# Patient Record
Sex: FEMALE | Race: WHITE | NOT HISPANIC OR LATINO | Employment: UNEMPLOYED | URBAN - METROPOLITAN AREA
[De-identification: names, ages, dates, MRNs, and addresses within clinical notes are randomized per-mention and may not be internally consistent; named-entity substitution may affect disease eponyms.]

---

## 2018-07-18 ENCOUNTER — HOSPITAL ENCOUNTER (EMERGENCY)
Facility: HOSPITAL | Age: 18
Discharge: HOME | End: 2018-07-18
Attending: EMERGENCY MEDICINE | Admitting: EMERGENCY MEDICINE

## 2018-07-18 VITALS
BODY MASS INDEX: 20.38 KG/M2 | TEMPERATURE: 98.5 F | WEIGHT: 115 LBS | HEART RATE: 94 BPM | HEIGHT: 63 IN | DIASTOLIC BLOOD PRESSURE: 79 MMHG | SYSTOLIC BLOOD PRESSURE: 133 MMHG | RESPIRATION RATE: 22 BRPM | OXYGEN SATURATION: 99 %

## 2018-07-18 DIAGNOSIS — S61.211A LACERATION OF LEFT INDEX FINGER WITHOUT FOREIGN BODY WITHOUT DAMAGE TO NAIL, INITIAL ENCOUNTER: Primary | ICD-10-CM

## 2018-07-18 PROCEDURE — 99283 EMERGENCY DEPT VISIT LOW MDM: CPT

## 2018-07-18 RX ORDER — LIDOCAINE HYDROCHLORIDE 10 MG/ML
5 INJECTION, SOLUTION EPIDURAL; INFILTRATION; INTRACAUDAL; PERINEURAL ONCE
Status: COMPLETED | OUTPATIENT
Start: 2018-07-18 | End: 2018-07-18

## 2018-07-18 RX ADMIN — LIDOCAINE HYDROCHLORIDE 5 ML: 10 INJECTION, SOLUTION EPIDURAL; INFILTRATION; INTRACAUDAL; PERINEURAL at 17:52

## 2018-07-26 ENCOUNTER — HOSPITAL ENCOUNTER (EMERGENCY)
Facility: HOSPITAL | Age: 18
Discharge: HOME | End: 2018-07-26

## 2018-07-26 VITALS
HEIGHT: 65 IN | HEART RATE: 84 BPM | OXYGEN SATURATION: 97 % | WEIGHT: 125 LBS | SYSTOLIC BLOOD PRESSURE: 121 MMHG | TEMPERATURE: 99.1 F | BODY MASS INDEX: 20.83 KG/M2 | DIASTOLIC BLOOD PRESSURE: 72 MMHG | RESPIRATION RATE: 14 BRPM

## 2018-07-26 PROCEDURE — 99201: CPT

## 2020-10-30 ENCOUNTER — TELEPHONE (OUTPATIENT)
Dept: FAMILY MEDICINE CLINIC | Facility: CLINIC | Age: 20
End: 2020-10-30

## 2020-10-30 NOTE — TELEPHONE ENCOUNTER
Patient's mom called in about her daughter getting an appt with Dr Ribeiro. Mom is a current patient of Dr Ribeiro. Daughter is a [employer] graduate. She stated that her daughter is having an issue with her breasts also.    Please advise  [phone]

## 2020-11-02 ENCOUNTER — TELEPHONE (OUTPATIENT)
Dept: OBSTETRICS AND GYNECOLOGY | Age: 20
End: 2020-11-02

## 2020-11-02 NOTE — TELEPHONE ENCOUNTER
Mother called on behalf of this patient, she wanted stated that her daughter returned from college and she's having some breast complications. Mother stated that her left breast is larger than the other and a very tender. Mother stated that her veins are more visible than normal  and they have concerns.      This patient is not established here at our practice and would be considered as a new patient, the mother is aware that your first available is pretty far out. Mother wanted to know if she can establish with you and be sometime soon if possible? Please advise.

## 2020-11-04 ENCOUNTER — OFFICE VISIT (OUTPATIENT)
Dept: OBSTETRICS AND GYNECOLOGY | Age: 20
End: 2020-11-04

## 2020-11-04 VITALS
WEIGHT: 127 LBS | HEIGHT: 63 IN | DIASTOLIC BLOOD PRESSURE: 78 MMHG | BODY MASS INDEX: 22.5 KG/M2 | SYSTOLIC BLOOD PRESSURE: 132 MMHG

## 2020-11-04 DIAGNOSIS — N64.4 MASTALGIA: Primary | ICD-10-CM

## 2020-11-04 PROCEDURE — 99203 OFFICE O/P NEW LOW 30 MIN: CPT | Performed by: OBSTETRICS & GYNECOLOGY

## 2020-11-04 RX ORDER — DIGOXIN 125 MCG
TABLET ORAL
COMMUNITY
Start: 2020-09-10 | End: 2021-12-10

## 2020-11-04 RX ORDER — ENALAPRIL MALEATE 5 MG/1
TABLET ORAL
COMMUNITY
End: 2021-12-10

## 2020-11-04 RX ORDER — LEVONORGESTREL AND ETHINYL ESTRADIOL 0.1-0.02MG
1 KIT ORAL DAILY
Qty: 28 TABLET | Refills: 12 | Status: SHIPPED | OUTPATIENT
Start: 2020-11-04 | End: 2020-11-25

## 2020-11-04 NOTE — PROGRESS NOTES
New GYN Problem    Chief Complaint   Patient presents with   • Establish Care     New pt. no pap on file. pt c/o LT breast pain        Karyna Hartman comes in with breast pain on the left. It is also around her pacemaker. She often lays on the left side of her chest when watching TV.  Has not noticed any masses, constantly doing breast exams, has medical anxiety    Born with heart failure and has multiple surgeries    Was on OCPs in the past but quit them a few months ago    Her GMA had ovarian cancer, her mom is ovarian ca awareness of [place] advocate. Negative BRCA testing    Going to see cardiology soon    Review of Systems   Constitutional: Negative for fever and unexpected weight change.   Respiratory: Negative for shortness of breath.    Cardiovascular: Negative for chest pain.   Gastrointestinal: Negative for abdominal pain, constipation and diarrhea.   Genitourinary: Negative for frequency and urgency.   Neurological: Negative for light-headedness and headaches.   Hematological: Negative for adenopathy.   Psychiatric/Behavioral: Negative for dysphoric mood.       Histories  Past Medical History:   Diagnosis Date   • Congenital heart defect    • Pacemaker        Past Surgical History:   Procedure Laterality Date   • CARDIAC SURGERY     • INSERT / REPLACE / REMOVE PACEMAKER     • PACEMAKER IMPLANTATION     • TRICUSPID VALVE REPLACEMENT         Family History   Problem Relation Age of Onset   • Ovarian cancer Maternal Grandmother        Social History     Socioeconomic History   • Marital status: Single     Spouse name: Not on file   • Number of children: Not on file   • Years of education: Not on file   • Highest education level: Not on file   Tobacco Use   • Smoking status: Never Smoker   • Smokeless tobacco: Never Used   Substance and Sexual Activity   • Alcohol use: No   • Drug use: No   • Sexual activity: Not Currently       OB History        0    Para   0    Term   0       0    AB   0     "Living   0       SAB   0    TAB   0    Ectopic   0    Molar   0    Multiple   0    Live Births   0                Physical Exam    /78   Ht 160 cm (63\")   Wt 57.6 kg (127 lb)   LMP 11/02/2020 (Exact Date)   Breastfeeding No   BMI 22.50 kg/m²     BMI: Body mass index is 22.5 kg/m².     General:   alert, appears stated age and cooperative   psych  normal affect, full range of motion   Skin  no acne, no lesions   Neck Nontender, no lymphadenopathy, no thyromegaly   Lung lungs clear to auscultation, no wheezes or rhonchi   Heart: heart regular rate and rhythm, no murmurs   Abdomen: soft, non-tender, without masses or organomegaly   Breast: inspection negative, no nipple discharge or bleeding, no masses or nodularity palpable, pacemaker palpable, nontender         Assessment/Plan    Problems Addressed this Visit        Nervous and Auditory    Mastalgia - Primary      Diagnoses       Codes Comments    Mastalgia    -  Primary ICD-10-CM: N64.4  ICD-9-CM: 611.71         This mastalgia is new in onset, only over the past week, which was the week preceding starting her menstrual cycle.  I discussed with her the likely cause could be having come off of oral contraceptives and the breast tissue reacting to the change in her hormone cycle.  Discussed the use of evening primrose oil and vitamin D.  Her exam is benign, but to return in a few months for full annual exam including Pap smear, if continuing to have pain then would consider imaging.      Kathrine Vickers MD  11/04/2020  14:32 EST              "

## 2020-11-11 NOTE — TELEPHONE ENCOUNTER
Pts mom would like to wait as she has gotten into her cardiologist. I have called mom on several occasions to attempt to get her scheduled with no luck until today. Pt has pacemaker that they are going to see if that is the issue. Pt's mom will call back and advise if she would like to establish care with you as she is waiting to see if there is speciality office she may have to go to in reference to what is going on.

## 2020-11-18 ENCOUNTER — PATIENT MESSAGE (OUTPATIENT)
Dept: OBSTETRICS AND GYNECOLOGY | Age: 20
End: 2020-11-18

## 2020-11-25 RX ORDER — ACETAMINOPHEN AND CODEINE PHOSPHATE 120; 12 MG/5ML; MG/5ML
1 SOLUTION ORAL DAILY
Qty: 28 TABLET | Refills: 12 | Status: SHIPPED | OUTPATIENT
Start: 2020-11-25 | End: 2020-12-07

## 2020-12-07 RX ORDER — NORETHINDRONE 0.35 MG/1
1 TABLET ORAL DAILY
Qty: 84 TABLET | Refills: 3 | Status: SHIPPED | OUTPATIENT
Start: 2020-12-07 | End: 2021-09-08

## 2021-01-28 ENCOUNTER — TELEPHONE (OUTPATIENT)
Dept: FAMILY MEDICINE CLINIC | Facility: CLINIC | Age: 21
End: 2021-01-28

## 2021-01-28 NOTE — TELEPHONE ENCOUNTER
Patient's mom is calling, she is a current patient of Dr. Ribeiro. She states Dr. Ribeiro said we would work patient in as soon as possible for an appointment. Soonest I can find is 3/9 so Mom wanted me to see if we could get sooner.

## 2021-01-29 ENCOUNTER — OFFICE VISIT (OUTPATIENT)
Dept: FAMILY MEDICINE CLINIC | Facility: CLINIC | Age: 21
End: 2021-01-29

## 2021-01-29 VITALS
TEMPERATURE: 99.6 F | HEIGHT: 63 IN | BODY MASS INDEX: 21.51 KG/M2 | OXYGEN SATURATION: 98 % | WEIGHT: 121.4 LBS | SYSTOLIC BLOOD PRESSURE: 124 MMHG | HEART RATE: 83 BPM | DIASTOLIC BLOOD PRESSURE: 62 MMHG | RESPIRATION RATE: 16 BRPM

## 2021-01-29 DIAGNOSIS — Z95.0 PACEMAKER: ICD-10-CM

## 2021-01-29 DIAGNOSIS — Z87.74 S/P VSD REPAIR: ICD-10-CM

## 2021-01-29 DIAGNOSIS — F41.9 ANXIETY: ICD-10-CM

## 2021-01-29 DIAGNOSIS — R59.9 PALPABLE LYMPH NODE: Primary | ICD-10-CM

## 2021-01-29 PROCEDURE — 99203 OFFICE O/P NEW LOW 30 MIN: CPT | Performed by: INTERNAL MEDICINE

## 2021-01-29 RX ORDER — LEVONORGESTREL AND ETHINYL ESTRADIOL 0.1-0.02MG
1 KIT ORAL DAILY
COMMUNITY
Start: 2020-11-04 | End: 2021-01-29

## 2021-01-29 NOTE — PROGRESS NOTES
"Chief Complaint  Establish Care (np) and Annual Exam (cpe )    Subjective          Karyna Hartman presents to [facility] for   History of Present Illness  Here as a new patient.  Her mom is my patient.  She graduated from [employer] and was recently in [place] trying to pursue acting but has come back home due to covid/work.  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 Late November, got a cold and noticed a swollen LN left axilla and then got covid in December and LN got bigger and more sore.  It has decreased in size and sometimes she feels it and sometimes she doesn't feel it.  Was born with multiple VSD's and after surgical repair in [place], she had complete AV block requiring a pacemaker.   Her current pacemaker is in the left axillary area and was placed in August 2019.  Sometimes she gets dryness in ears and will feel when ears are crusty the  related LN's in her neck get tendern and once the skin normalizes, the LN resolve.  No weight loss.  No night sweats. No fevers.  No cough.  Appetite is normal.  Energy is good and denies fatigue.   Stopped her progesterone bcp in the fall. Started combination pill but then switched back to micronor for some reason.  Her left breast started hurting.  Saw her gyn and had an exam and she switched her back to the progesterone only BCP.  Hasn't had pap smear yet.    She admits to a lot of anxiety and OCD tendencies.  Has been dx with OCD in past but isn't on medications.  Anxiety interferes with her life and prevents her from planning and enjoying life.    Objective   Vital Signs:   /62   Pulse 83   Temp 99.6 °F (37.6 °C) (Temporal)   Resp 16   Ht 160 cm (63\")   Wt 55.1 kg (121 lb 6.4 oz)   SpO2 98%   BMI 21.51 kg/m²     Physical Exam  Vitals signs and nursing note reviewed.   Constitutional:       Appearance: Normal appearance. She is well-developed.   HENT:      " Head: Normocephalic and atraumatic.      Right Ear: External ear normal.      Left Ear: External ear normal.   Eyes:      Extraocular Movements: Extraocular movements intact.      Conjunctiva/sclera: Conjunctivae normal.   Neck:      Musculoskeletal: Neck supple.      Vascular: No carotid bruit.   Cardiovascular:      Rate and Rhythm: Normal rate and regular rhythm.      Heart sounds: Normal heart sounds.      Comments: No bruits  Pulmonary:      Effort: Pulmonary effort is normal. No respiratory distress.      Breath sounds: Normal breath sounds. No wheezing or rales.   Chest:      Breasts:         Right: No inverted nipple, mass, nipple discharge, skin change or tenderness.         Left: No inverted nipple, mass, nipple discharge, skin change or tenderness.   Abdominal:      General: Bowel sounds are normal. There is no distension.      Palpations: Abdomen is soft. There is no mass.      Tenderness: There is no abdominal tenderness.   Lymphadenopathy:      Cervical: No cervical adenopathy.   Skin:     General: Skin is warm.   Neurological:      General: No focal deficit present.      Mental Status: She is alert and oriented to person, place, and time.   Psychiatric:         Mood and Affect: Mood normal.         Behavior: Behavior normal.         Thought Content: Thought content normal.         Judgment: Judgment normal.     bilateral axilla normal other than a small fingertip deep LN in left axila most likely near her current pacemaker.  No cervical LN's palpable.  Breasts normal bilaterally     Result Review :                 Assessment and Plan    Problem List Items Addressed This Visit        Cardiac and Vasculature    S/P VSD repair    Pacemaker    Overview     She is unhappy with her current adult cardiologist.  I gave her the name of a few [facility] [place]ologists to call and schedule appt            Mental Health    Anxiety    Overview     Discussed that she might benefit from talking to a counselor to help with  OCD and anxiety.  Also a medication like prozac or zoloft would benefit her most likely.  She wants to discuss this with her mom and will get back to me.            Other Visit Diagnoses     Palpable lymph node    -  Primary    the palpable LN doesn't appear pathological however there is a significant amount of anxiety around it therefore i will get an u/s of the area to be sure.      Relevant Orders    US Axilla Left        Total time spent CC and counseling patient and ordering test, discussing care and potential medications, benefits of counseling, documenting care and reviewing notes 40 minutes.   Follow Up   Return in about 6 months (around 7/29/2021) for Annual physical.  Patient was given instructions and counseling regarding her condition or for health maintenance advice. Please see specific information pulled into the AVS if appropriate.

## 2021-02-17 ENCOUNTER — APPOINTMENT (OUTPATIENT)
Dept: ULTRASOUND IMAGING | Facility: HOSPITAL | Age: 21
End: 2021-02-17

## 2021-02-22 ENCOUNTER — TELEPHONE (OUTPATIENT)
Dept: FAMILY MEDICINE CLINIC | Facility: CLINIC | Age: 21
End: 2021-02-22

## 2021-02-22 NOTE — TELEPHONE ENCOUNTER
PATIENT STATES: Can you send my ultrasound test orders to [facility] on [address]? Its less expensive then having it done at the hospital. I hope to have it done this week. The pain around my armpit is very annoying and I want it to go away...... this was sent in  Her mychart please advise     PATIENT CAN BE REACHED ON:[phone]

## 2021-02-25 ENCOUNTER — TELEPHONE (OUTPATIENT)
Dept: FAMILY MEDICINE CLINIC | Facility: CLINIC | Age: 21
End: 2021-02-25

## 2021-02-25 DIAGNOSIS — R59.9 PALPABLE LYMPH NODE: ICD-10-CM

## 2021-02-25 NOTE — TELEPHONE ENCOUNTER
Caller: Karyna Hartman    Relationship: Self    Best call back number: [phone]    Caller requesting test results: Yes    What test was performed: Ultrasound     When was the test performed: 2/24/2021    Where was the test performed: [facility]    Additional notes: Would like a call with results.

## 2021-03-01 NOTE — TELEPHONE ENCOUNTER
PATIENT CALLED REQUESTING AN APPOINTMENT.  FIRST AVAILABLE IS IN MAY.  PATIENT ALSO REQUESTED TO SPEAK WITH MOMO AND WANTED TO SEE WHAT ELSE COULD BE DONE AS PATIENT IS STILL EXPERIENCING PAIN IN HER LEFT BREAST AND UNDER HER ARM.  PACEMAKER IS IN HER BREAST TISSUE AND WAS CURIOUS TO KNOW IF THIS COULD BE CAUSING THE PAIN AS WELL.    PATIENT STATED THAT THE PAIN HAS BEEN SINCE OCT OF  2020 AND WOULD LIKE TO GET SOME RELIEF.  IF RELIEF CAN BE TAKEN CARE OF WITH MEDICATION PLEASE USE    [facility] [id number] [place] [address] AT [facility] - [phone]  - [phone] FX     PLEASE ADVISE  [phone]

## 2021-03-09 ENCOUNTER — OFFICE VISIT (OUTPATIENT)
Dept: FAMILY MEDICINE CLINIC | Facility: CLINIC | Age: 21
End: 2021-03-09

## 2021-03-09 VITALS
TEMPERATURE: 98 F | SYSTOLIC BLOOD PRESSURE: 124 MMHG | DIASTOLIC BLOOD PRESSURE: 72 MMHG | BODY MASS INDEX: 21.85 KG/M2 | OXYGEN SATURATION: 99 % | WEIGHT: 123.3 LBS | HEART RATE: 84 BPM | RESPIRATION RATE: 16 BRPM | HEIGHT: 63 IN

## 2021-03-09 DIAGNOSIS — N64.4 MASTALGIA: ICD-10-CM

## 2021-03-09 DIAGNOSIS — F41.9 ANXIETY: Primary | ICD-10-CM

## 2021-03-09 DIAGNOSIS — N64.4 BREAST PAIN IN FEMALE: ICD-10-CM

## 2021-03-09 DIAGNOSIS — R42 LIGHTHEADEDNESS: ICD-10-CM

## 2021-03-09 PROCEDURE — 99214 OFFICE O/P EST MOD 30 MIN: CPT | Performed by: INTERNAL MEDICINE

## 2021-03-09 PROCEDURE — 93000 ELECTROCARDIOGRAM COMPLETE: CPT | Performed by: INTERNAL MEDICINE

## 2021-03-09 NOTE — PROGRESS NOTES
Chief Complaint  Breast Pain (pt states started after stopping birth control and has been going on since 10/20 and started a new one and continues to hurt ), dry eyes (pt states has some dry eyes latly worse then normal and has to use aquaphor ), Menstrual Problem (pt states last 2 months having 2 periods at once ), Chest Pain ( pt states rib pain under her breast ), and Dizziness (pt states wakes up with dizziness and feels like shes floating or off balance )    Subjective          Karyna Hartman presents to [facility]  History of Present Illness  Patient has history of congenital VSD and is status post repair as a baby.  She has a pacemaker placed due to postoperative complete AV block.  Her pacemaker is located over her left breast and was placed in Aug 2019.  She c/o left breast pain since stopping OCP in the fall.  Saw her gyn and she started something different OCPand that made it worse.  Then she restarted the original OCP.  Hurts in the morning when she wakes up.  Skin feels irritated in the lateral left breast.   Wearing bra makes it worse.  Pain comes and goes.  She can't stop thinking about the pain and thinks about it all the time.  She restarted the OCP in early January 2021.   Left breast pain worsened when she had covid in December 2020.  Taking vit e and primrose and not better.  Breast exam by me and her gyn were normal.  Hurts worse when she drives.  In the evening around 5-7 pm the pain resolves and doesn't start back until the following morning.  Left axillary u/s was negative in 2/2021. She also c/o swollen and dry eyes.  Worse on the left eye. Also c/o floating sensation when she is walking around--like an off balance sensation.    Has not seen a new cardiologist as she did not like the one she saw last year at [place].  I gave her names of multiple physicians in the local cardiology group at her last visit in late January 2021.  She has a history of anxiety and  "OCD.  She has never taken medication for either of these.  She did see a counselor at some point for OCD and anxiety a few years back.  Objective   Vital Signs:   /72   Pulse 84   Temp 98 °F (36.7 °C) (Temporal)   Resp 16   Ht 160 cm (63\")   Wt 55.9 kg (123 lb 4.8 oz)   SpO2 99%   BMI 21.84 kg/m²     Physical Exam  Vitals and nursing note reviewed.   Constitutional:       Appearance: Normal appearance. She is well-developed.   HENT:      Head: Normocephalic and atraumatic.      Right Ear: External ear normal.      Left Ear: External ear normal.   Eyes:      Extraocular Movements: Extraocular movements intact.      Conjunctiva/sclera: Conjunctivae normal.   Cardiovascular:      Rate and Rhythm: Normal rate and regular rhythm.      Heart sounds: Normal heart sounds.      Comments: No bruits  Pulmonary:      Effort: Pulmonary effort is normal. No respiratory distress.      Breath sounds: Normal breath sounds. No wheezing or rales.   Abdominal:      General: Bowel sounds are normal. There is no distension.      Palpations: Abdomen is soft. There is no mass.      Tenderness: There is no abdominal tenderness.   Musculoskeletal:      Cervical back: Neck supple.   Lymphadenopathy:      Cervical: No cervical adenopathy.   Skin:     General: Skin is warm.   Neurological:      General: No focal deficit present.      Mental Status: She is alert and oriented to person, place, and time.   Psychiatric:         Mood and Affect: Mood normal.         Behavior: Behavior normal.         Thought Content: Thought content normal.         Judgment: Judgment normal.        Result Review :                 Assessment and Plan    Diagnoses and all orders for this visit:    1. Anxiety (Primary)  -     CBC & Differential  -     Comprehensive Metabolic Panel  -     TSH  -     T4, Free    2. Mastalgia  -     CBC & Differential  -     Comprehensive Metabolic Panel  -     TSH  -     T4, Free  -     US breast left complete; Future    3. " Lightheadedness  -     CBC & Differential  -     Comprehensive Metabolic Panel  -     TSH  -     T4, Free    4. Breast pain in female  -     US breast left complete; Future        Follow Up   Return in about 6 months (around 9/9/2021).  Patient was given instructions and counseling regarding her condition or for health maintenance advice. Please see specific information pulled into the AVS if appropriate.     TUMS trial to see if this left breast pain could possibly be related to acid reflux.  We discussed possibly doing omeprazole but she would like to minimize any prescription drugs at this time.  She is going to try taking Tums or an over-the-counter Pepcid when her symptoms start in the morning to see if that resolves the pain.  The other option I stated that could be going on is musculoskeletal chest wall pain related to her multiple surgeries and the pacemaker itself.  Taken ibuprofen as needed and if that helps the pain could help us figure out if this could be musculoskeletal in origin.  I think she is most concerned that it could be something within the breast and therefore I have ordered a breast ultrasound on the left.  My exam again today is normal.  I cannot reproduce the pain by palpating the area.  There does seem to be quite a bit of anxiety related to multiple symptoms.  The floating sensation most certainly is related to anxiety.  Her EKG shows sinus rhythm today.  I have highly encouraged her to see someone at the [facility] cardiology group to follow-up on her pacemaker.  We are going to do some blood work today to make sure her thyroid CBC and CMP are normal.  Again I encouraged her to see a counselor to help work through some of the anxiety and OCD tendencies.  We also discussed possible medications but at this time she is not interested in any kind of medication.  Total time spent coordinating care 30 minutes    Answers for HPI/ROS submitted by the patient on 3/3/2021  Please describe your  symptoms.: breast pain on the side and under left breast. sore and irritation/sensitive. pain in Pacemaker sight. Sore armpit.  Have you had these symptoms before?: Yes  How long have you been having these symptoms?: Greater than 2 weeks  Please list any medications you are currently taking for this condition.: None.  Please describe any probable cause for these symptoms. : Pain started when I went off birth control and continued when I started a new pill. I stopped that pill and went back to my old perception. The pain went away for a couple weeks after two months then came back. Pain never really went away.  What is the primary reason for your visit?: Other

## 2021-03-09 NOTE — PROGRESS NOTES
Procedure     ECG 12 Lead    Date/Time: 3/9/2021 5:29 PM  Performed by: Elva Ribeiro MD  Authorized by: Elva Ribeiro MD   Comparison: not compared with previous ECG   Rhythm: sinus rhythm  Rate: normal  Conduction: non-specific intraventricular conduction delay    Clinical impression: non-specific ECG             Answers for HPI/ROS submitted by the patient on 3/3/2021  Please describe your symptoms.: breast pain on the side and under left breast. sore and irritation/sensitive. pain in Pacemaker sight. Sore armpit.  Have you had these symptoms before?: Yes  How long have you been having these symptoms?: Greater than 2 weeks  Please list any medications you are currently taking for this condition.: None.  Please describe any probable cause for these symptoms. : Pain started when I went off birth control and continued when I started a new pill. I stopped that pill and went back to my old perception. The pain went away for a couple weeks after two months then came back. Pain never really went away.  What is the primary reason for your visit?: Other

## 2021-03-10 LAB
ALBUMIN SERPL-MCNC: 5.1 G/DL (ref 3.9–5)
ALBUMIN/GLOB SERPL: 2.4 {RATIO} (ref 1.2–2.2)
ALP SERPL-CCNC: 67 IU/L (ref 39–117)
ALT SERPL-CCNC: 17 IU/L (ref 0–32)
AST SERPL-CCNC: 20 IU/L (ref 0–40)
BASOPHILS # BLD AUTO: 0.1 X10E3/UL (ref 0–0.2)
BASOPHILS NFR BLD AUTO: 1 %
BILIRUB SERPL-MCNC: 0.4 MG/DL (ref 0–1.2)
BUN SERPL-MCNC: 11 MG/DL (ref 6–20)
BUN/CREAT SERPL: 17 (ref 9–23)
CALCIUM SERPL-MCNC: 9.9 MG/DL (ref 8.7–10.2)
CHLORIDE SERPL-SCNC: 104 MMOL/L (ref 96–106)
CO2 SERPL-SCNC: 22 MMOL/L (ref 20–29)
CREAT SERPL-MCNC: 0.63 MG/DL (ref 0.57–1)
EOSINOPHIL # BLD AUTO: 0.2 X10E3/UL (ref 0–0.4)
EOSINOPHIL NFR BLD AUTO: 5 %
ERYTHROCYTE [DISTWIDTH] IN BLOOD BY AUTOMATED COUNT: 12.1 % (ref 11.7–15.4)
GLOBULIN SER CALC-MCNC: 2.1 G/DL (ref 1.5–4.5)
GLUCOSE SERPL-MCNC: 97 MG/DL (ref 65–99)
HCT VFR BLD AUTO: 37.9 % (ref 34–46.6)
HGB BLD-MCNC: 13.1 G/DL (ref 11.1–15.9)
IMM GRANULOCYTES # BLD AUTO: 0 X10E3/UL (ref 0–0.1)
IMM GRANULOCYTES NFR BLD AUTO: 0 %
LYMPHOCYTES # BLD AUTO: 1.5 X10E3/UL (ref 0.7–3.1)
LYMPHOCYTES NFR BLD AUTO: 28 %
MCH RBC QN AUTO: 31.4 PG (ref 26.6–33)
MCHC RBC AUTO-ENTMCNC: 34.6 G/DL (ref 31.5–35.7)
MCV RBC AUTO: 91 FL (ref 79–97)
MONOCYTES # BLD AUTO: 0.5 X10E3/UL (ref 0.1–0.9)
MONOCYTES NFR BLD AUTO: 10 %
NEUTROPHILS # BLD AUTO: 3 X10E3/UL (ref 1.4–7)
NEUTROPHILS NFR BLD AUTO: 56 %
PLATELET # BLD AUTO: 331 X10E3/UL (ref 150–450)
POTASSIUM SERPL-SCNC: 4.2 MMOL/L (ref 3.5–5.2)
PROT SERPL-MCNC: 7.2 G/DL (ref 6–8.5)
RBC # BLD AUTO: 4.17 X10E6/UL (ref 3.77–5.28)
SODIUM SERPL-SCNC: 140 MMOL/L (ref 134–144)
T4 FREE SERPL-MCNC: 1.27 NG/DL (ref 0.82–1.77)
TSH SERPL DL<=0.005 MIU/L-ACNC: 2.31 UIU/ML (ref 0.45–4.5)
WBC # BLD AUTO: 5.4 X10E3/UL (ref 3.4–10.8)

## 2021-03-16 DIAGNOSIS — N64.4 MASTALGIA: ICD-10-CM

## 2021-03-16 DIAGNOSIS — N64.4 BREAST PAIN IN FEMALE: ICD-10-CM

## 2021-04-16 ENCOUNTER — BULK ORDERING (OUTPATIENT)
Dept: CASE MANAGEMENT | Facility: OTHER | Age: 21
End: 2021-04-16

## 2021-04-16 DIAGNOSIS — Z23 IMMUNIZATION DUE: ICD-10-CM

## 2021-06-07 ENCOUNTER — OFFICE VISIT (OUTPATIENT)
Dept: FAMILY MEDICINE CLINIC | Facility: CLINIC | Age: 21
End: 2021-06-07

## 2021-06-07 VITALS — WEIGHT: 126 LBS | BODY MASS INDEX: 22.32 KG/M2 | HEIGHT: 63 IN

## 2021-06-07 DIAGNOSIS — H69.83 EUSTACHIAN TUBE DYSFUNCTION, BILATERAL: Primary | ICD-10-CM

## 2021-06-07 PROCEDURE — 99213 OFFICE O/P EST LOW 20 MIN: CPT | Performed by: NURSE PRACTITIONER

## 2021-06-07 RX ORDER — MULTIVIT WITH MINERALS/LUTEIN
250 TABLET ORAL DAILY
COMMUNITY
End: 2022-03-15

## 2021-06-07 RX ORDER — FLUTICASONE PROPIONATE 50 MCG
2 SPRAY, SUSPENSION (ML) NASAL DAILY
Qty: 18.2 ML | Refills: 2 | Status: SHIPPED | OUTPATIENT
Start: 2021-06-07 | End: 2023-03-21

## 2021-06-07 NOTE — PROGRESS NOTES
Hears popping and clicking, ear pressure no pain no fever no chills every once while little dizziness      Healthy, up-to-date with cardiology history of congenital heart defect please see ST had Covid in December already not getting the vaccine

## 2021-06-07 NOTE — PATIENT INSTRUCTIONS
Discharge instructions    Flonase 2 sprays each nostril twice daily  For 1 week then daily thereafter  Give this 2 to 3 weeks and see if it does not get better if not improved after 3 weeks call for referral to ENT  Severe pain severe dizziness fever chills urgent recheck you are      Follow-up with Dr. Ribeiro for routine care  Avoid decongestions          Eustachian Tube Dysfunction    Eustachian tube dysfunction refers to a condition in which a blockage develops in the narrow passage that connects the middle ear to the back of the nose (eustachian tube). The eustachian tube regulates air pressure in the middle ear by letting air move between the ear and nose. It also helps to drain fluid from the middle ear space.  Eustachian tube dysfunction can affect one or both ears. When the eustachian tube does not function properly, air pressure, fluid, or both can build up in the middle ear.  What are the causes?  This condition occurs when the eustachian tube becomes blocked or cannot open normally. Common causes of this condition include:  · Ear infections.  · Colds and other infections that affect the nose, mouth, and throat (upper respiratory tract).  · Allergies.  · Irritation from cigarette smoke.  · Irritation from stomach acid coming up into the esophagus (gastroesophageal reflux). The esophagus is the tube that carries food from the mouth to the stomach.  · Sudden changes in air pressure, such as from descending in an airplane or scuba diving.  · Abnormal growths in the nose or throat, such as:  ? Growths that line the nose (nasal polyps).  ? Abnormal growth of cells (tumors).  ? Enlarged tissue at the back of the throat (adenoids).  What increases the risk?  You are more likely to develop this condition if:  · You smoke.  · You are overweight.  · You are a child who has:  ? Certain birth defects of the mouth, such as cleft palate.  ? Large tonsils or adenoids.  What are the signs or symptoms?  Common symptoms of  "this condition include:  · A feeling of fullness in the ear.  · Ear pain.  · Clicking or popping noises in the ear.  · Ringing in the ear.  · Hearing loss.  · Loss of balance.  · Dizziness.  Symptoms may get worse when the air pressure around you changes, such as when you travel to an area of high elevation, fly on an airplane, or go scuba diving.  How is this diagnosed?  This condition may be diagnosed based on:  · Your symptoms.  · A physical exam of your ears, nose, and throat.  · Tests, such as those that measure:  ? The movement of your eardrum (tympanogram).  ? Your hearing (audiometry).  How is this treated?  Treatment depends on the cause and severity of your condition.  · In mild cases, you may relieve your symptoms by moving air into your ears. This is called \"popping the ears.\"  · In more severe cases, or if you have symptoms of fluid in your ears, treatment may include:  ? Medicines to relieve congestion (decongestants).  ? Medicines that treat allergies (antihistamines).  ? Nasal sprays or ear drops that contain medicines that reduce swelling (steroids).  ? A procedure to drain the fluid in your eardrum (myringotomy). In this procedure, a small tube is placed in the eardrum to:  § Drain the fluid.  § Restore the air in the middle ear space.  ? A procedure to insert a balloon device through the nose to inflate the opening of the eustachian tube (balloon dilation).  Follow these instructions at home:  Lifestyle  · Do not do any of the following until your health care provider approves:  ? Travel to high altitudes.  ? Fly in airplanes.  ? Work in a pressurized cabin or room.  ? Scuba dive.  · Do not use any products that contain nicotine or tobacco, such as cigarettes and e-cigarettes. If you need help quitting, ask your health care provider.  · Keep your ears dry. Wear fitted earplugs during showering and bathing. Dry your ears completely after.  General instructions  · Take over-the-counter and " prescription medicines only as told by your health care provider.  · Use techniques to help pop your ears as recommended by your health care provider. These may include:  ? Chewing gum.  ? Yawning.  ? Frequent, forceful swallowing.  ? Closing your mouth, holding your nose closed, and gently blowing as if you are trying to blow air out of your nose.  · Keep all follow-up visits as told by your health care provider. This is important.  Contact a health care provider if:  · Your symptoms do not go away after treatment.  · Your symptoms come back after treatment.  · You are unable to pop your ears.  · You have:  ? A fever.  ? Pain in your ear.  ? Pain in your head or neck.  ? Fluid draining from your ear.  · Your hearing suddenly changes.  · You become very dizzy.  · You lose your balance.  Summary  · Eustachian tube dysfunction refers to a condition in which a blockage develops in the eustachian tube.  · It can be caused by ear infections, allergies, inhaled irritants, or abnormal growths in the nose or throat.  · Symptoms include ear pain, hearing loss, or ringing in the ears.  · Mild cases are treated with maneuvers to unblock the ears, such as yawning or ear popping.  · Severe cases are treated with medicines. Surgery may also be done (rare).  This information is not intended to replace advice given to you by your health care provider. Make sure you discuss any questions you have with your health care provider.  Document Revised: 04/09/2019 Document Reviewed: 04/09/2019  [employer] Patient Education © 2021 [employer]

## 2021-06-22 NOTE — PROGRESS NOTES
"Chief Complaint  Left Otitis Media (started saturday night)    Subjective          Karyna Hartman presents to [facility]  Hears popping and clicking, ear pressure no pain no fever no chills every once while little dizziness mild symp.   Healthy, up-to-date with cardiology history of congenital heart defect please see ST had Covid in December already not getting the vaccine      Objective   Vital Signs:   Ht 160 cm (63\")   Wt 57.2 kg (126 lb)   BMI 22.32 kg/m²     Physical Exam  Constitutional:       Appearance: Normal appearance.   HENT:      Head:      Comments: TMs are dull, no redness no swelling no tragus tenderness pharynx clear neck is supple turbinates congested mildly    Cardiovascular:      Rate and Rhythm: Normal rate and regular rhythm.   Pulmonary:      Effort: Pulmonary effort is normal.      Breath sounds: Normal breath sounds.   Skin:     General: Skin is warm and dry.   Neurological:      General: No focal deficit present.      Mental Status: She is alert and oriented to person, place, and time.   Psychiatric:         Mood and Affect: Mood normal.         Behavior: Behavior normal.        Result Review :                 Assessment and Plan    Diagnoses and all orders for this visit:    1. Eustachian tube dysfunction, bilateral (Primary)    Other orders  -     fluticasone (Flonase) 50 MCG/ACT nasal spray; 2 sprays into the nostril(s) as directed by provider Daily.  Dispense: 18.2 mL; Refill: 2        Follow Up   No follow-ups on file.  Patient was given instructions and counseling regarding her condition or for health maintenance advice. Please see specific information pulled into the AVS if appropriate.     See patient instructions get this time if not proved she will see ENT she will follow-up with Dr. Ribeiro routine care  "

## 2021-09-02 ENCOUNTER — OFFICE VISIT (OUTPATIENT)
Dept: FAMILY MEDICINE CLINIC | Facility: CLINIC | Age: 21
End: 2021-09-02

## 2021-09-02 VITALS
DIASTOLIC BLOOD PRESSURE: 68 MMHG | BODY MASS INDEX: 22.36 KG/M2 | RESPIRATION RATE: 16 BRPM | SYSTOLIC BLOOD PRESSURE: 128 MMHG | TEMPERATURE: 96.8 F | WEIGHT: 126.2 LBS | HEIGHT: 63 IN | HEART RATE: 81 BPM | OXYGEN SATURATION: 98 %

## 2021-09-02 DIAGNOSIS — L81.2 FRECKLE: ICD-10-CM

## 2021-09-02 DIAGNOSIS — R92.2 BREAST DENSITY: Primary | ICD-10-CM

## 2021-09-02 PROCEDURE — 99213 OFFICE O/P EST LOW 20 MIN: CPT | Performed by: INTERNAL MEDICINE

## 2021-09-02 NOTE — PROGRESS NOTES
"Chief Complaint  Suspicious Skin Lesion (mole in crotch area ) and Mass (pt found a lump in breast yesterday )    Subjective          Karyna Hartman presents to [facility]  History of Present Illness  C/o left breast lump for 1 day.  No painful.  She is on OCP and LMP was 3 weeks ago.  No nipple drainage. Had left breast and left axillary u/s done 3/2021 due to left breast pain and axillary pain.  Was normal.  They did recommend with persistent or recurrent sx to repeat imaging.  Also c/o mole in left groin and not sure how long it has been there.  No pain.  No d/c or discoloration to skin and no bleeding.      Objective   Vital Signs:   /68   Pulse 81   Temp 96.8 °F (36 °C) (Temporal)   Resp 16   Ht 160 cm (63\")   Wt 57.2 kg (126 lb 3.2 oz)   SpO2 98%   BMI 22.36 kg/m²     Physical Exam  Constitutional:       Appearance: Normal appearance.   Pulmonary:      Effort: Pulmonary effort is normal.   Chest:      Breasts:         Right: Normal.         Left: Normal.          Comments: Localized breast density at the 5 oclock position with fibrocystic changes by palpation  Skin:     Comments: Two tiny flat freckles at left groin.  Symmetric.  Normal brown color.     Neurological:      General: No focal deficit present.      Mental Status: She is alert and oriented to person, place, and time.   Psychiatric:         Mood and Affect: Mood normal.         Behavior: Behavior normal.         Thought Content: Thought content normal.         Judgment: Judgment normal.        Result Review :                 Assessment and Plan    Diagnoses and all orders for this visit:    1. Breast density (Primary)  -     US breast left limited; Future    2. Freckle        Follow Up   No follow-ups on file.  Patient was given instructions and counseling regarding her condition or for health maintenance advice. Please see specific information pulled into the AVS if appropriate.     Her breast exam is " essentially normal other than an asymmetric area of the 5 o'clock position on the left breast that feels more like fibrocystic changes.  I do not particularly feel a mass or nodule like she described that she felt yesterday.  I am concerned that this area does feel little cystic so I am going to get an ultrasound just to confirm normal breast tissue.  It will be good to look at the area again as its been 6 months since her previous ultrasound.  The 2 areas on her left inner thigh groin area are benign freckles and need observation at this time only.

## 2021-09-08 RX ORDER — NORETHINDRONE 0.35 MG/1
TABLET ORAL
Qty: 84 TABLET | Refills: 3 | Status: SHIPPED | OUTPATIENT
Start: 2021-09-08 | End: 2022-03-15 | Stop reason: SDUPTHER

## 2021-12-01 ENCOUNTER — TELEPHONE (OUTPATIENT)
Dept: FAMILY MEDICINE CLINIC | Facility: CLINIC | Age: 21
End: 2021-12-01

## 2021-12-01 NOTE — TELEPHONE ENCOUNTER
Moms at work right now and heading home to see her and will make a determination if she is going to take her to the ER for treatment as if she needed fluids due to dehydration [facility] would not be able to help with that. I offered her a virtual visit or office visit. Mom declined and stated she doesn't think she can allow it to go on any further without hydrating.

## 2021-12-01 NOTE — TELEPHONE ENCOUNTER
Caller: Karyna Hartman    Relationship: Self    Best call back number: [phone]    What medication are you requesting: NAUSEA MEDICATION    What are your current symptoms: VOMITING AND DIARRHEA     How long have you been experiencing symptoms: 12 HOURS AGO     Have you had these symptoms before:    [x] Yes  [] No    Have you been treated for these symptoms before:   [x] Yes  [] No    If a prescription is needed, what is your preferred pharmacy and phone number: [facility] [address] - [address] [place] [address] AT  [address] & [facility] [address] - [phone] [facility] [phone] FX     Additional notes: PATIENTS MOTHER STATES PATIENT HAS BEEN VOMITING FOR 12 HOURS

## 2021-12-02 NOTE — TELEPHONE ENCOUNTER
PATIENTS MOM CALLING BACK TO SAY SALEEM IS MUCH BETTER NOW AND THE VOMITING HAS STOPPED. SHE DOES NOT NEED TO BE SEEN     MOM> [phone]

## 2021-12-06 DIAGNOSIS — Z00.00 ROUTINE PHYSICAL EXAMINATION: Primary | ICD-10-CM

## 2021-12-09 LAB
ALBUMIN SERPL-MCNC: 4.9 G/DL (ref 3.9–5)
ALBUMIN/GLOB SERPL: 2 {RATIO} (ref 1.2–2.2)
ALP SERPL-CCNC: 59 IU/L (ref 44–121)
ALT SERPL-CCNC: 30 IU/L (ref 0–32)
APPEARANCE UR: ABNORMAL
AST SERPL-CCNC: 34 IU/L (ref 0–40)
BACTERIA #/AREA URNS HPF: ABNORMAL /[HPF]
BACTERIA UR CULT: NORMAL
BACTERIA UR CULT: NORMAL
BASOPHILS # BLD AUTO: 0 X10E3/UL (ref 0–0.2)
BASOPHILS NFR BLD AUTO: 1 %
BILIRUB SERPL-MCNC: 0.7 MG/DL (ref 0–1.2)
BILIRUB UR QL STRIP: NEGATIVE
BUN SERPL-MCNC: 11 MG/DL (ref 6–20)
BUN/CREAT SERPL: 16 (ref 9–23)
CALCIUM SERPL-MCNC: 9.6 MG/DL (ref 8.7–10.2)
CASTS URNS QL MICRO: ABNORMAL /LPF
CHLORIDE SERPL-SCNC: 102 MMOL/L (ref 96–106)
CHOLEST SERPL-MCNC: 142 MG/DL (ref 100–199)
CO2 SERPL-SCNC: 23 MMOL/L (ref 20–29)
COLOR UR: YELLOW
CREAT SERPL-MCNC: 0.67 MG/DL (ref 0.57–1)
CRYSTALS URNS MICRO: ABNORMAL
EOSINOPHIL # BLD AUTO: 0.2 X10E3/UL (ref 0–0.4)
EOSINOPHIL NFR BLD AUTO: 5 %
EPI CELLS #/AREA URNS HPF: >10 /HPF (ref 0–10)
ERYTHROCYTE [DISTWIDTH] IN BLOOD BY AUTOMATED COUNT: 12.2 % (ref 11.7–15.4)
GLOBULIN SER CALC-MCNC: 2.5 G/DL (ref 1.5–4.5)
GLUCOSE SERPL-MCNC: 92 MG/DL (ref 65–99)
GLUCOSE UR QL: NEGATIVE
HCT VFR BLD AUTO: 39.3 % (ref 34–46.6)
HDLC SERPL-MCNC: 49 MG/DL
HGB BLD-MCNC: 13.3 G/DL (ref 11.1–15.9)
HGB UR QL STRIP: NEGATIVE
IMM GRANULOCYTES # BLD AUTO: 0 X10E3/UL (ref 0–0.1)
IMM GRANULOCYTES NFR BLD AUTO: 0 %
KETONES UR QL STRIP: ABNORMAL
LDLC SERPL CALC-MCNC: 77 MG/DL (ref 0–99)
LDLC/HDLC SERPL: 1.6 RATIO (ref 0–3.2)
LEUKOCYTE ESTERASE UR QL STRIP: ABNORMAL
LYMPHOCYTES # BLD AUTO: 1.4 X10E3/UL (ref 0.7–3.1)
LYMPHOCYTES NFR BLD AUTO: 30 %
MCH RBC QN AUTO: 30.4 PG (ref 26.6–33)
MCHC RBC AUTO-ENTMCNC: 33.8 G/DL (ref 31.5–35.7)
MCV RBC AUTO: 90 FL (ref 79–97)
MICRO URNS: ABNORMAL
MONOCYTES # BLD AUTO: 0.4 X10E3/UL (ref 0.1–0.9)
MONOCYTES NFR BLD AUTO: 8 %
NEUTROPHILS # BLD AUTO: 2.6 X10E3/UL (ref 1.4–7)
NEUTROPHILS NFR BLD AUTO: 56 %
NITRITE UR QL STRIP: POSITIVE
PH UR STRIP: 5.5 [PH] (ref 5–7.5)
PLATELET # BLD AUTO: 303 X10E3/UL (ref 150–450)
POTASSIUM SERPL-SCNC: 3.8 MMOL/L (ref 3.5–5.2)
PROT SERPL-MCNC: 7.4 G/DL (ref 6–8.5)
PROT UR QL STRIP: ABNORMAL
RBC # BLD AUTO: 4.37 X10E6/UL (ref 3.77–5.28)
RBC #/AREA URNS HPF: ABNORMAL /HPF (ref 0–2)
SODIUM SERPL-SCNC: 139 MMOL/L (ref 134–144)
SP GR UR: >=1.03 (ref 1–1.03)
T4 FREE SERPL-MCNC: 1.36 NG/DL (ref 0.82–1.77)
TRIGL SERPL-MCNC: 84 MG/DL (ref 0–149)
TSH SERPL DL<=0.005 MIU/L-ACNC: 2.34 UIU/ML (ref 0.45–4.5)
UNIDENT CRYS URNS QL MICRO: PRESENT
URINALYSIS REFLEX: ABNORMAL
UROBILINOGEN UR STRIP-MCNC: 1 MG/DL (ref 0.2–1)
VLDLC SERPL CALC-MCNC: 16 MG/DL (ref 5–40)
WBC # BLD AUTO: 4.7 X10E3/UL (ref 3.4–10.8)
WBC #/AREA URNS HPF: ABNORMAL /HPF (ref 0–5)

## 2021-12-10 ENCOUNTER — OFFICE VISIT (OUTPATIENT)
Dept: FAMILY MEDICINE CLINIC | Facility: CLINIC | Age: 21
End: 2021-12-10

## 2021-12-10 VITALS
HEART RATE: 85 BPM | SYSTOLIC BLOOD PRESSURE: 116 MMHG | HEIGHT: 63 IN | OXYGEN SATURATION: 100 % | DIASTOLIC BLOOD PRESSURE: 64 MMHG | WEIGHT: 128.2 LBS | TEMPERATURE: 97.7 F | BODY MASS INDEX: 22.71 KG/M2 | RESPIRATION RATE: 16 BRPM

## 2021-12-10 DIAGNOSIS — F41.9 ANXIETY: ICD-10-CM

## 2021-12-10 DIAGNOSIS — Z00.00 WELL ADULT EXAM: ICD-10-CM

## 2021-12-10 DIAGNOSIS — R82.90 ABNORMAL URINALYSIS: Primary | ICD-10-CM

## 2021-12-10 PROCEDURE — 99395 PREV VISIT EST AGE 18-39: CPT | Performed by: INTERNAL MEDICINE

## 2021-12-10 RX ORDER — ESCITALOPRAM OXALATE 5 MG/1
5 TABLET ORAL DAILY
Qty: 30 TABLET | Refills: 5 | Status: SHIPPED | OUTPATIENT
Start: 2021-12-10 | End: 2022-06-08

## 2021-12-10 NOTE — PROGRESS NOTES
"Chief Complaint  Lump (pt complains of lump on left side of head ), Annual Exam (cpe ), and Chest Pain ( pt complains of chest pain when sitting and feels anxious )    Subjective          Karyna Hartman presents to [facility]  History of Present Illness  Here for CPE.  Pain over her pacemaker that was replaced in 2018 over the left chest wall and radiates into latera chest wall and axilla.  Occurs when she is seating at her desk.  No problems when she is up moving around.  advil nor tylenol help reduce the discomfort.  Occurs mainly at work. ? Anxiety and gets nervous about going to the doctor.  When she is relaxed she feels better.  She has a gynecologist and will get with her for her pap smear.  Has a tender LN over left posterior auricular area that has shrank over the past week. Has scalp psoriasis.   Area is improving.  Wanting a breast exam today.  Not ready for pap smear.  Scared.    Objective   Vital Signs:   /64   Pulse 85   Temp 97.7 °F (36.5 °C) (Temporal)   Resp 16   Ht 160 cm (63\")   Wt 58.2 kg (128 lb 3.2 oz)   SpO2 100%   BMI 22.71 kg/m²     Physical Exam  Vitals and nursing note reviewed.   Constitutional:       Appearance: Normal appearance. She is well-developed.   HENT:      Head: Normocephalic and atraumatic.      Right Ear: External ear normal.      Left Ear: External ear normal.   Eyes:      Extraocular Movements: Extraocular movements intact.      Conjunctiva/sclera: Conjunctivae normal.   Neck:      Vascular: No carotid bruit.   Cardiovascular:      Rate and Rhythm: Normal rate and regular rhythm.      Heart sounds: Normal heart sounds.      Comments: No bruits  Multiple healed incisions of chest wall from open heart surgery and pacemaker placement.   Breasts are normal.  No masses.  Pleuritic tenderness over the pacemaker incisions.   Pulmonary:      Effort: Pulmonary effort is normal. No respiratory distress.      Breath sounds: Normal breath " sounds. No wheezing or rales.   Chest:   Breasts:      Right: No inverted nipple, mass, nipple discharge, skin change or tenderness.      Left: No inverted nipple, mass, nipple discharge, skin change or tenderness.       Abdominal:      General: Bowel sounds are normal. There is no distension.      Palpations: Abdomen is soft. There is no mass.      Tenderness: There is no abdominal tenderness.   Musculoskeletal:      Cervical back: Neck supple.   Lymphadenopathy:      Cervical: No cervical adenopathy.   Skin:     General: Skin is warm.   Neurological:      General: No focal deficit present.      Mental Status: She is alert and oriented to person, place, and time.   Psychiatric:         Mood and Affect: Mood normal.         Behavior: Behavior normal.         Thought Content: Thought content normal.         Judgment: Judgment normal.        Result Review :                 Assessment and Plan    Diagnoses and all orders for this visit:    1. Abnormal urinalysis (Primary)  -     Urinalysis With Culture If Indicated -; Future    2. Well adult exam    3. Anxiety    Other orders  -     escitalopram (Lexapro) 5 MG tablet; Take 1 tablet by mouth Daily.  Dispense: 30 tablet; Refill: 5        Follow Up   Return in about 1 year (around 12/10/2022).  Patient was given instructions and counseling regarding her condition or for health maintenance advice. Please see specific information pulled into the AVS if appropriate.     Anxiety is negatively impacting her day to day life--seems to be a factor in her atypical, pleuritic CP.  She sees her cardiologist regularly for pacemaker checks (h/o VSD repair as baby)  Start lexapro 5 mg qd and cautioned on side effects.   Repeat ua c/s due to very concentrated urine (just got over stomach virus)  O/w labs look good  Vaccinations reviewed.  She is up to date on gardasil but doesn't recall dates.  Declines Tdap.  Flu shot unavailable in our office  Highly encouraged her to get with her gyn  for her pap smear.  Preventative counseling provided.

## 2022-03-15 ENCOUNTER — OFFICE VISIT (OUTPATIENT)
Dept: OBSTETRICS AND GYNECOLOGY | Age: 22
End: 2022-03-15

## 2022-03-15 VITALS
DIASTOLIC BLOOD PRESSURE: 74 MMHG | BODY MASS INDEX: 22.71 KG/M2 | HEIGHT: 63 IN | SYSTOLIC BLOOD PRESSURE: 126 MMHG | WEIGHT: 128.2 LBS

## 2022-03-15 DIAGNOSIS — Z30.41 ENCOUNTER FOR SURVEILLANCE OF CONTRACEPTIVE PILLS: Primary | ICD-10-CM

## 2022-03-15 DIAGNOSIS — Z01.419 ENCOUNTER FOR GYNECOLOGICAL EXAMINATION WITHOUT ABNORMAL FINDING: ICD-10-CM

## 2022-03-15 PROCEDURE — 99395 PREV VISIT EST AGE 18-39: CPT | Performed by: OBSTETRICS & GYNECOLOGY

## 2022-03-15 RX ORDER — NORETHINDRONE 0.35 MG/1
1 TABLET ORAL DAILY
Qty: 84 TABLET | Refills: 3 | Status: SHIPPED | OUTPATIENT
Start: 2022-03-15 | End: 2022-06-14

## 2022-03-15 RX ORDER — VIT C/VIT D3/E/ZINC/ELDERBERRY 65 MG-3.15
TABLET,CHEWABLE ORAL
COMMUNITY
End: 2022-09-21

## 2022-03-15 NOTE — PROGRESS NOTES
Routine Annual Visit    3/15/2022    Patient: Karyna Hartman          MR#:[id number]      Chief Complaint   Patient presents with   • Gynecologic Exam     AE Today, Last AE 2000       History of Present Illness    21 y.o. female  who presents for annual exam. She is doing well, just notes occasional abdominal cramping after eating. Worse with certain foods, suggested she keep track of foods that seem to cause this and discuss with PCP.  No breast pain issues  Taking micronor and happy, only occasional bleeding. Hx dysmenorrhea  Not SA currently.       Health Maintenance  Gardasil did receive  Last pap: none yet    No LMP recorded. (Menstrual status: Oral contraceptives).  Obstetric History:  OB History        0    Para   0    Term   0       0    AB   0    Living   0       SAB   0    IAB   0    Ectopic   0    Molar   0    Multiple   0    Live Births   0               Menstrual History:     No LMP recorded. (Menstrual status: Oral contraceptives).       ________________________________________  Patient Active Problem List   Diagnosis   • Mastalgia   • S/P VSD repair   • Pacemaker   • Anxiety       Past Medical History:   Diagnosis Date   • Congenital heart defect    • Pacemaker    • Pacemaker    • VSD (ventricular septal defect)        Family History   Problem Relation Age of Onset   • Ovarian cancer Maternal Grandmother    • Endometriosis Maternal Aunt    • Drug abuse Brother        Past Surgical History:   Procedure Laterality Date   • CARDIAC SURGERY     • INSERT / REPLACE / REMOVE PACEMAKER     • PACEMAKER IMPLANTATION     • TRICUSPID VALVE REPLACEMENT         Social History     Tobacco Use   Smoking Status Never Smoker   Smokeless Tobacco Never Used       has a current medication list which includes the following prescription(s): escitalopram, evening primrose oil, will, airborne elderberry, prenatal vit-fe fumarate-fa, vitamin e, and  "fluticasone.  ________________________________________      Review of Systems   Constitutional: Negative for fever and unexpected weight change.   Respiratory: Negative for shortness of breath.    Cardiovascular: Negative for chest pain.   Gastrointestinal: Negative for abdominal pain, constipation and diarrhea.        Abd cramping   Genitourinary: Negative for frequency and urgency.   Hematological: Negative for adenopathy.   Psychiatric/Behavioral: Negative for dysphoric mood.       Objective   Physical Exam    /74   Ht 160 cm (63\")   Wt 58.2 kg (128 lb 3.2 oz)   Breastfeeding No   BMI 22.71 kg/m²    BP Readings from Last 3 Encounters:   03/15/22 126/74   12/10/21 116/64   09/02/21 128/68      Wt Readings from Last 3 Encounters:   03/15/22 58.2 kg (128 lb 3.2 oz)   12/10/21 58.2 kg (128 lb 3.2 oz)   09/02/21 57.2 kg (126 lb 3.2 oz)         BMI: Body mass index is 22.71 kg/m².       General:   alert, appears stated age and cooperative   Neck: No thyromegaly or LAD   Heart:: regular rate and rhythm, S1, S2 normal, no murmur, click, rub or gallop   Lungs: normal respiratory effort and auscultation   Abdomen: soft, non-tender, without masses or organomegaly   Breast: inspection negative, no nipple discharge or bleeding, no masses or nodularity palpable   Urethra and bladder: urethral meatus normal; bladder nontender to palpation;   Vulva: normal, Bartholin's, Urethra, Warrens's normal   Vagina: normal mucosa, normal discharge   Cervix: no lesions and nulliparous appearance   Uterus: normal size and anteverted   Adnexa: normal adnexa and no mass, fullness, tenderness       Assessment:    normal annual exam  Cervical cancer screening  STI screening  OCP contraception     Plan:    Plan     []  Mammogram request made  [x]  PAP done  []  Labs:   [x]  GC/Chl/TV  []  DEXA scan   []  Referral for colonoscopy:     Refill of micronor to pharmacy today    Counseling  [x]  Nutrition  [x]  Physical activity/regular " exercise   [x]  Healthy weight  []  Injury prevention  []  Smoking cessation  []  Substance misuse/abuse  [x]  Sexual behavior  []  STD prevention  [x]  Contraception  []  Dental health  []  Mental health  []  Immunization  [x]  Encouraged SBE        Kathrine Vickers MD  03/15/2022  10:55 EDT

## 2022-03-20 LAB
C TRACH RRNA CVX QL NAA+PROBE: NEGATIVE
CONV .: NORMAL
CYTOLOGIST CVX/VAG CYTO: NORMAL
CYTOLOGY CVX/VAG DOC CYTO: NORMAL
CYTOLOGY CVX/VAG DOC THIN PREP: NORMAL
DX ICD CODE: NORMAL
HIV 1 & 2 AB SER-IMP: NORMAL
N GONORRHOEA RRNA CVX QL NAA+PROBE: NEGATIVE
OTHER STN SPEC: NORMAL
STAT OF ADQ CVX/VAG CYTO-IMP: NORMAL
T VAGINALIS RRNA SPEC QL NAA+PROBE: NEGATIVE

## 2022-03-21 NOTE — PROGRESS NOTES
Please notify that her Pap was normal, there were no abnormal cells found. Also screening for gonorrhea, chlamydia, and trichomonas was negative    Kathrine Vickers MD  3/21/2022  06:57 EDT

## 2022-06-08 RX ORDER — ESCITALOPRAM OXALATE 5 MG/1
TABLET ORAL
Qty: 30 TABLET | Refills: 5 | Status: SHIPPED | OUTPATIENT
Start: 2022-06-08 | End: 2022-12-15

## 2022-06-08 NOTE — TELEPHONE ENCOUNTER
Rx Refill Note  Requested Prescriptions     Pending Prescriptions Disp Refills   • escitalopram (LEXAPRO) 5 MG tablet [Pharmacy Med Name: ESCITALOPRAM 5 MG TABLET] 30 tablet 5     Sig: TAKE ONE TABLET BY MOUTH DAILY      Last office visit with prescribing clinician: 12/10/2021      Next office visit with prescribing clinician: Visit date not found            Luzmaria Cadena MA  06/08/22, 09:09 EDT

## 2022-06-14 RX ORDER — NORETHINDRONE 0.35 MG/1
TABLET ORAL
Qty: 84 TABLET | Refills: 3 | Status: SHIPPED | OUTPATIENT
Start: 2022-06-14 | End: 2023-03-21

## 2022-06-16 ENCOUNTER — OFFICE VISIT (OUTPATIENT)
Dept: FAMILY MEDICINE CLINIC | Facility: CLINIC | Age: 22
End: 2022-06-16

## 2022-06-16 VITALS
OXYGEN SATURATION: 98 % | SYSTOLIC BLOOD PRESSURE: 119 MMHG | BODY MASS INDEX: 21.26 KG/M2 | TEMPERATURE: 97.5 F | DIASTOLIC BLOOD PRESSURE: 67 MMHG | HEART RATE: 87 BPM | WEIGHT: 120 LBS | HEIGHT: 63 IN

## 2022-06-16 DIAGNOSIS — R05.9 COUGH: Primary | ICD-10-CM

## 2022-06-16 LAB
EXPIRATION DATE: NORMAL
FLUAV AG NPH QL: NEGATIVE
FLUBV AG NPH QL: NEGATIVE
INTERNAL CONTROL: NORMAL
Lab: NORMAL

## 2022-06-16 PROCEDURE — 87804 INFLUENZA ASSAY W/OPTIC: CPT | Performed by: NURSE PRACTITIONER

## 2022-06-16 PROCEDURE — 99213 OFFICE O/P EST LOW 20 MIN: CPT | Performed by: NURSE PRACTITIONER

## 2022-06-16 NOTE — PROGRESS NOTES
"Chief Complaint  Cough (Pt c/o headaches, congestion, cough , sore throat, and ear pain x 3 days. Tested neg for covid)    Subjective        Karyna Hartman presents to [facility]  Very pleasant patient here today who developed headache congestion cough sore throat or ear pain since Monday,  Monday yesterday COVID test at home was negative  She has no lethargy no chest pain or increased shortness of breath no loss of taste or smell.  She is healthy she has had VSD repair, but she reports that she has a healthy heart and has no substantial long term issues here.    Social history  No tobacco drug or alcohol abuse,    Past medical history as above healthy    She had the [employer] vaccine and she had COVID previously        Cough        Objective   Vital Signs:  /67   Pulse 87   Temp 97.5 °F (36.4 °C)   Ht 160 cm (63\")   Wt 54.4 kg (120 lb)   SpO2 98%   BMI 21.26 kg/m²   Estimated body mass index is 21.26 kg/m² as calculated from the following:    Height as of this encounter: 160 cm (63\").    Weight as of this encounter: 54.4 kg (120 lb).    BMI is within normal parameters. No other follow-up for BMI required.      Physical Exam  Vitals reviewed.   Constitutional:       Appearance: Normal appearance. She is well-developed. She is not ill-appearing.   HENT:      Head: Normocephalic.      Ears:      Comments: Turbinates congested pharynx is clear uvula midline no cervical adenopathy     Nose: Nose normal.   Eyes:      General: No scleral icterus.     Conjunctiva/sclera: Conjunctivae normal.      Pupils: Pupils are equal, round, and reactive to light.   Neck:      Thyroid: No thyromegaly.      Vascular: No JVD.   Cardiovascular:      Rate and Rhythm: Normal rate and regular rhythm.      Heart sounds: Normal heart sounds. No murmur heard.    No friction rub. No gallop.   Pulmonary:      Effort: Pulmonary effort is normal. No respiratory distress.      Breath sounds: " Normal breath sounds. No stridor. No wheezing or rales.   Abdominal:      Comments: No hepatosplenomegaly, no ascites,   Musculoskeletal:         General: No tenderness.      Cervical back: Neck supple.   Lymphadenopathy:      Cervical: No cervical adenopathy.   Skin:     General: Skin is warm and dry.      Findings: No erythema or rash.   Neurological:      Mental Status: She is alert and oriented to person, place, and time.      Deep Tendon Reflexes: Reflexes are normal and symmetric.   Psychiatric:         Behavior: Behavior normal.         Thought Content: Thought content normal.         Judgment: Judgment normal.        Result Review :                Assessment and Plan   Diagnoses and all orders for this visit:    1. 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  Patient was given instructions and counseling regarding her condition or for health maintenance advice. Please see specific information pulled into the AVS if appropriate.   Patient has cough, upper respiratory infection viral  Flu test is negative cannot completely rule out COVID but she declines any further testing does not think she has COVID and unlikely that she does  Push fluids    Anything over-the-counter for cough and congestion Robitussin-DM high fever chest pain shortness of breath lethargy weakness emergency room immediately  Sent a message if not improving in several days

## 2022-07-13 ENCOUNTER — OFFICE VISIT (OUTPATIENT)
Dept: FAMILY MEDICINE CLINIC | Facility: CLINIC | Age: 22
End: 2022-07-13

## 2022-07-13 VITALS
TEMPERATURE: 96.8 F | HEART RATE: 86 BPM | BODY MASS INDEX: 21.26 KG/M2 | SYSTOLIC BLOOD PRESSURE: 116 MMHG | HEIGHT: 63 IN | DIASTOLIC BLOOD PRESSURE: 68 MMHG | RESPIRATION RATE: 18 BRPM | OXYGEN SATURATION: 99 %

## 2022-07-13 DIAGNOSIS — R05.9 COUGH: ICD-10-CM

## 2022-07-13 DIAGNOSIS — J30.1 SEASONAL ALLERGIC RHINITIS DUE TO POLLEN: Primary | ICD-10-CM

## 2022-07-13 PROCEDURE — 99213 OFFICE O/P EST LOW 20 MIN: CPT | Performed by: NURSE PRACTITIONER

## 2022-07-13 RX ORDER — BENZONATATE 100 MG/1
200 CAPSULE ORAL 3 TIMES DAILY PRN
Qty: 60 CAPSULE | Refills: 0 | Status: SHIPPED | OUTPATIENT
Start: 2022-07-13 | End: 2023-03-21

## 2022-07-13 NOTE — PROGRESS NOTES
"Chief Complaint  Cough and Wheezing    Subjective        Karyna Hartman presents to [facility]  Karyna presents for recent URI, treated with antibiotics. States her cough is currently dry, mild post nasal drainage. States when breathing in, has tickle and cough that is  Denies shortness of breath or wheezing.   States at times annoying and increased frequency, other times    Cough  Associated symptoms include wheezing.   Wheezing   Associated symptoms include coughing.       Objective   Vital Signs:  /68 (BP Location: Right arm, Patient Position: Sitting, Cuff Size: Adult)   Pulse 86   Temp 96.8 °F (36 °C) (Temporal)   Resp 18   Ht 160 cm (62.99\")   SpO2 99%   BMI 21.26 kg/m²   Estimated body mass index is 21.26 kg/m² as calculated from the following:    Height as of this encounter: 160 cm (62.99\").    Weight as of 6/16/22: 54.4 kg (120 lb).    BMI is within normal parameters. No other follow-up for BMI required.      Physical Exam  Vitals reviewed.   Constitutional:       Appearance: Normal appearance.   HENT:      Right Ear: Tympanic membrane and ear canal normal.      Left Ear: Tympanic membrane and ear canal normal.      Nose: Nose normal.      Mouth/Throat:      Mouth: Mucous membranes are moist.      Pharynx: Oropharynx is clear.   Eyes:      Conjunctiva/sclera: Conjunctivae normal.      Pupils: Pupils are equal, round, and reactive to light.   Cardiovascular:      Rate and Rhythm: Normal rate and regular rhythm.      Heart sounds: No murmur heard.    No friction rub. No gallop.   Pulmonary:      Effort: Pulmonary effort is normal. No respiratory distress.      Breath sounds: Normal breath sounds. No wheezing, rhonchi or rales.   Skin:     General: Skin is warm and dry.   Neurological:      Mental Status: She is alert and oriented to person, place, and time.   Psychiatric:         Mood and Affect: Mood normal.        Result Review :                Assessment and " Plan   Diagnoses and all orders for this visit:    1. Seasonal allergic rhinitis due to pollen (Primary)    2. Cough    Other orders  -     benzonatate (Tessalon Perles) 100 MG capsule; Take 2 capsules by mouth 3 (Three) Times a Day As Needed for Cough.  Dispense: 60 capsule; Refill: 0             Follow Up   No follow-ups on file.  Patient was given instructions and counseling regarding her condition or for health maintenance advice. Please see specific information pulled into the AVS if appropriate.     Allergic rhinitis: recommend changing antihistamine to zyrtec or allegra for the next 30 days, then ok to resume claritin  Recent illness, now with lingering cough, will start tessalon perles as directed, increase fluids, for increased wheezing or shortness of breath, she will message or follow up in office for furhter instructions

## 2022-09-21 ENCOUNTER — OFFICE VISIT (OUTPATIENT)
Dept: FAMILY MEDICINE CLINIC | Facility: CLINIC | Age: 22
End: 2022-09-21

## 2022-09-21 VITALS
SYSTOLIC BLOOD PRESSURE: 106 MMHG | HEIGHT: 63 IN | OXYGEN SATURATION: 99 % | BODY MASS INDEX: 20.73 KG/M2 | HEART RATE: 88 BPM | RESPIRATION RATE: 18 BRPM | DIASTOLIC BLOOD PRESSURE: 70 MMHG | TEMPERATURE: 97.1 F | WEIGHT: 117 LBS

## 2022-09-21 DIAGNOSIS — Z11.3 ROUTINE SCREENING FOR STI (SEXUALLY TRANSMITTED INFECTION): ICD-10-CM

## 2022-09-21 DIAGNOSIS — J02.9 ACUTE PHARYNGITIS, UNSPECIFIED ETIOLOGY: Primary | ICD-10-CM

## 2022-09-21 LAB
EXPIRATION DATE: NORMAL
EXPIRATION DATE: NORMAL
HETEROPH AB SER QL LA: NEGATIVE
INTERNAL CONTROL: NORMAL
INTERNAL CONTROL: NORMAL
Lab: NORMAL
Lab: NORMAL
S PYO AG THROAT QL: NEGATIVE

## 2022-09-21 PROCEDURE — 87880 STREP A ASSAY W/OPTIC: CPT | Performed by: NURSE PRACTITIONER

## 2022-09-21 PROCEDURE — 99213 OFFICE O/P EST LOW 20 MIN: CPT | Performed by: NURSE PRACTITIONER

## 2022-09-21 PROCEDURE — 86308 HETEROPHILE ANTIBODY SCREEN: CPT | Performed by: NURSE PRACTITIONER

## 2022-09-21 RX ORDER — DOXYCYCLINE 100 MG/1
100 CAPSULE ORAL EVERY 12 HOURS SCHEDULED
Qty: 20 CAPSULE | Refills: 0 | Status: SHIPPED | OUTPATIENT
Start: 2022-09-21 | End: 2023-03-21

## 2022-09-21 NOTE — PROGRESS NOTES
"Chief Complaint  Sore Throat (Ear pain, both x 1 day)    Subjective        Karyna Hartman presents to [facility]  History of Present Illness  Karyna presents with one day history of sore throat. Afebrile. White patches bilaterally, ear pain bilaterally. Mild headache. No known ill contacts.   Sore Throat         Objective   Vital Signs:  /70 (BP Location: Right arm, Patient Position: Sitting, Cuff Size: Adult)   Pulse 88   Temp 97.1 °F (36.2 °C) (Temporal)   Resp 18   Ht 160 cm (62.99\")   Wt 53.1 kg (117 lb)   SpO2 99%   BMI 20.73 kg/m²   Estimated body mass index is 20.73 kg/m² as calculated from the following:    Height as of this encounter: 160 cm (62.99\").    Weight as of this encounter: 53.1 kg (117 lb).    BMI is within normal parameters. No other follow-up for BMI required.      Physical Exam  Constitutional:       Appearance: Normal appearance.   HENT:      Right Ear: Tympanic membrane and ear canal normal.      Left Ear: Tympanic membrane and ear canal normal.      Nose: Nose normal.      Mouth/Throat:      Mouth: Mucous membranes are moist.      Pharynx: Oropharyngeal exudate and posterior oropharyngeal erythema present. No pharyngeal swelling or uvula swelling.      Tonsils: Tonsillar exudate present. No tonsillar abscesses. 1+ on the right. 1+ on the left.   Eyes:      Conjunctiva/sclera: Conjunctivae normal.      Pupils: Pupils are equal, round, and reactive to light.   Cardiovascular:      Rate and Rhythm: Normal rate and regular rhythm.      Heart sounds: No murmur heard.    No friction rub. No gallop.   Pulmonary:      Effort: Pulmonary effort is normal. No respiratory distress.      Breath sounds: Normal breath sounds. No wheezing, rhonchi or rales.   Abdominal:      General: Bowel sounds are normal. There is no distension.      Palpations: Abdomen is soft. There is no mass.      Tenderness: There is no abdominal tenderness.      Hernia: No hernia is " present.   Skin:     General: Skin is warm and dry.   Neurological:      Mental Status: She is alert and oriented to person, place, and time.   Psychiatric:         Mood and Affect: Mood normal.        Result Review :                Assessment and Plan   Diagnoses and all orders for this visit:    1. Acute pharyngitis, unspecified etiology (Primary)  -     POCT Infectious mononucleosis antibody  -     Throat / Upper Respiratory Culture - Swab, Throat  -     POCT rapid strep A    2. Routine screening for STI (sexually transmitted infection)  -     Chlamydia trachomatis, Neisseria gonorrhoeae, Trichomonas vaginalis, PCR - Urine, Urine, Clean Catch    Other orders  -     doxycycline (MONODOX) 100 MG capsule; Take 1 capsule by mouth Every 12 (Twelve) Hours.  Dispense: 20 capsule; Refill: 0      Pharyngitis: negative for strep and mono, discussed abx given appearance and symptoms, she is concerned for STI as she has a new partner, will treat with doxycycline and await cultures       Follow Up   No follow-ups on file.  Patient was given instructions and counseling regarding her condition or for health maintenance advice. Please see specific information pulled into the AVS if appropriate.

## 2022-09-23 LAB
C TRACH RRNA SPEC QL NAA+PROBE: NEGATIVE
N GONORRHOEA RRNA SPEC QL NAA+PROBE: NEGATIVE
T VAGINALIS RRNA SPEC QL NAA+PROBE: NEGATIVE

## 2022-09-24 LAB
BACTERIA SPEC RESP CULT: NORMAL
BACTERIA SPEC RESP CULT: NORMAL

## 2022-12-15 RX ORDER — ESCITALOPRAM OXALATE 5 MG/1
TABLET ORAL
Qty: 30 TABLET | Refills: 5 | Status: SHIPPED | OUTPATIENT
Start: 2022-12-15

## 2022-12-15 NOTE — TELEPHONE ENCOUNTER
Rx Refill Note  Requested Prescriptions     Pending Prescriptions Disp Refills   • escitalopram (LEXAPRO) 5 MG tablet [Pharmacy Med Name: ESCITALOPRAM 5 MG TABLET] 30 tablet 5     Sig: TAKE ONE TABLET BY MOUTH DAILY      Last office visit with prescribing clinician: 6/24/2022   Last telemedicine visit with prescribing clinician: Visit date not found   Next office visit with prescribing clinician: Visit date not found                         Would you like a call back once the refill request has been completed: [] Yes [] No    If the office needs to give you a call back, can they leave a voicemail: [] Yes [] No    Luzmaria Cadena MA  12/15/22, 08:53 EST

## 2023-03-21 ENCOUNTER — OFFICE VISIT (OUTPATIENT)
Dept: OBSTETRICS AND GYNECOLOGY | Age: 23
End: 2023-03-21
Payer: COMMERCIAL

## 2023-03-21 VITALS
WEIGHT: 118.6 LBS | DIASTOLIC BLOOD PRESSURE: 66 MMHG | HEIGHT: 63 IN | BODY MASS INDEX: 21.02 KG/M2 | SYSTOLIC BLOOD PRESSURE: 112 MMHG

## 2023-03-21 DIAGNOSIS — Z20.2 POSSIBLE EXPOSURE TO STD: Primary | ICD-10-CM

## 2023-03-21 PROCEDURE — 99395 PREV VISIT EST AGE 18-39: CPT | Performed by: OBSTETRICS & GYNECOLOGY

## 2023-03-21 NOTE — PROGRESS NOTES
Routine Annual Visit    3/21/2023    Patient: Karyna Hartman          MR#:[id number]      Chief Complaint   Patient presents with   • Gynecologic Exam     AE Today, Last AE 3/15/2022 (-)       History of Present Illness    22 y.o. female  who presents for annual exam. She is doing well, no current complaints. Stopped taking the micronor, notes that she just generally feels better off of it. Declines to start any other contraception but is SA. Discussed tracking menses and condoms.   Desires STI screen    Patient's last menstrual period was 2023 (approximate).  Obstetric History:  OB History        0    Para   0    Term   0       0    AB   0    Living   0       SAB   0    IAB   0    Ectopic   0    Molar   0    Multiple   0    Live Births   0               Menstrual History:     Patient's last menstrual period was 2023 (approximate).       ________________________________________  Patient Active Problem List   Diagnosis   • Mastalgia   • S/P VSD repair   • Pacemaker   • Anxiety       Past Medical History:   Diagnosis Date   • Congenital heart defect    • Coronary artery disease    • History of medical problems congenital heart defect   • Pacemaker    • Pacemaker    • VSD (ventricular septal defect)        Family History   Problem Relation Age of Onset   • Ovarian cancer Maternal Grandmother    • Cancer Maternal Grandmother         OVARIAN   • Endometriosis Maternal Aunt    • Drug abuse Brother    • COPD Maternal Grandfather         MESOTHELIOMA   • Cancer Paternal Grandfather         SPINAL       Past Surgical History:   Procedure Laterality Date   • CARDIAC SURGERY     • INSERT / REPLACE / REMOVE PACEMAKER     • PACEMAKER IMPLANTATION     • TRICUSPID VALVE REPLACEMENT         Social History     Tobacco Use   Smoking Status Never   Smokeless Tobacco Never       has a current medication list which includes the following prescription(s): escitalopram and  "will.  ________________________________________      Review of Systems   Constitutional: Negative for fever and unexpected weight change.   Respiratory: Negative for shortness of breath.    Cardiovascular: Negative for chest pain.   Gastrointestinal: Negative for abdominal pain, constipation and diarrhea.   Genitourinary: Negative for frequency and urgency.   Hematological: Negative for adenopathy.   Psychiatric/Behavioral: Negative for dysphoric mood.       Objective   Physical Exam    /66   Ht 160 cm (62.99\")   Wt 53.8 kg (118 lb 9.6 oz)   LMP 03/18/2023 (Approximate)   BMI 21.02 kg/m²    BP Readings from Last 3 Encounters:   03/21/23 112/66   09/21/22 106/70   07/13/22 116/68      Wt Readings from Last 3 Encounters:   03/21/23 53.8 kg (118 lb 9.6 oz)   09/21/22 53.1 kg (117 lb)   06/16/22 54.4 kg (120 lb)         BMI: Body mass index is 21.02 kg/m².       General:   alert, appears stated age and cooperative   Neck: No thyromegaly or LAD   Heart:: regular rate and rhythm, S1, S2 normal, no murmur, click, rub or gallop   Lungs: normal respiratory effort and auscultation   Abdomen: soft, non-tender, without masses or organomegaly   Breast: inspection negative, no nipple discharge or bleeding, no masses or nodularity palpable   Urethra and bladder: urethral meatus normal; bladder nontender to palpation;   Vulva: normal, Bartholin's, Urethra, [place]'s normal   Vagina: normal mucosa, normal discharge   Cervix: no lesions and nulliparous appearance   Uterus: normal size, mobile and non-tender   Adnexa: normal adnexa and no mass, fullness, tenderness       Assessment:    normal annual exam     Plan:    Plan     []  Mammogram request made  []  PAP done  []  Labs:   [x]  GC/Chl/TV  []  DEXA scan   []  Referral for colonoscopy:       Counseling  [x]  Nutrition  [x]  Physical activity/regular exercise   [x]  Healthy weight  []  Injury prevention  []  Smoking cessation  []  Substance misuse/abuse  [x]  Sexual " behavior  [x]  STD prevention  [x]  Contraception  []  Dental health  []  Mental health  []  Immunization  [x]  Encouraged SBE        Kathrine Vickers MD  03/21/2023  12:36 EDT

## 2023-03-23 NOTE — PROGRESS NOTES
Please notify that screening for gonorrhea, chlamydia, trichomonas was negative    Kathrine Vickers MD  3/23/2023  08:46 EDT

## 2023-05-18 RX ORDER — AMOXICILLIN 250 MG/1
CAPSULE ORAL
Qty: 4 CAPSULE | Refills: 1 | Status: SHIPPED | OUTPATIENT
Start: 2023-05-18

## 2023-06-06 ENCOUNTER — OFFICE VISIT (OUTPATIENT)
Dept: FAMILY MEDICINE CLINIC | Facility: CLINIC | Age: 23
End: 2023-06-06
Payer: COMMERCIAL

## 2023-06-06 VITALS
TEMPERATURE: 96.4 F | HEIGHT: 62 IN | OXYGEN SATURATION: 97 % | WEIGHT: 111.9 LBS | SYSTOLIC BLOOD PRESSURE: 109 MMHG | BODY MASS INDEX: 20.59 KG/M2 | HEART RATE: 71 BPM | DIASTOLIC BLOOD PRESSURE: 67 MMHG

## 2023-06-06 DIAGNOSIS — R42 DIZZINESS: Primary | ICD-10-CM

## 2023-06-06 LAB
B-HCG UR QL: NEGATIVE
BILIRUB BLD-MCNC: NEGATIVE MG/DL
CLARITY, POC: CLEAR
COLOR UR: YELLOW
EXPIRATION DATE: ABNORMAL
EXPIRATION DATE: NORMAL
EXPIRATION DATE: NORMAL
FLUAV AG UPPER RESP QL IA.RAPID: NOT DETECTED
FLUBV AG UPPER RESP QL IA.RAPID: NOT DETECTED
GLUCOSE UR STRIP-MCNC: NEGATIVE MG/DL
INTERNAL CONTROL: NORMAL
INTERNAL NEGATIVE CONTROL: NEGATIVE
INTERNAL POSITIVE CONTROL: POSITIVE
KETONES UR QL: NEGATIVE
LEUKOCYTE EST, POC: NEGATIVE
Lab: ABNORMAL
Lab: NORMAL
Lab: NORMAL
NITRITE UR-MCNC: NEGATIVE MG/ML
PH UR: 6 [PH] (ref 5–8)
PROT UR STRIP-MCNC: NEGATIVE MG/DL
RBC # UR STRIP: ABNORMAL /UL
SARS-COV-2 AG UPPER RESP QL IA.RAPID: NOT DETECTED
SP GR UR: 1.02 (ref 1–1.03)
UROBILINOGEN UR QL: NORMAL

## 2023-06-06 PROCEDURE — 81003 URINALYSIS AUTO W/O SCOPE: CPT | Performed by: NURSE PRACTITIONER

## 2023-06-06 PROCEDURE — 87428 SARSCOV & INF VIR A&B AG IA: CPT | Performed by: NURSE PRACTITIONER

## 2023-06-06 PROCEDURE — 99213 OFFICE O/P EST LOW 20 MIN: CPT | Performed by: NURSE PRACTITIONER

## 2023-06-06 PROCEDURE — 81025 URINE PREGNANCY TEST: CPT | Performed by: NURSE PRACTITIONER

## 2023-06-06 NOTE — PROGRESS NOTES
"Chief Complaint  Dizziness (5/31/23 Lightheaded was given meclizine on 6/4/24), brain fog , Facial Pain (Sinus pressure 5/31/23), and Abdominal Pain (Stomach is \"pulsating\")    Subjective        Karyna Hartman presents to [facility]  History of Present Illness  Pt here with concern for dizziness since she has been back from [facility].  She vaped a lot while there-marijuana from dispensary.  Normally uses marijuana here but used different formulation.  She has sinus pressure across forehead.  No fever, chills.  Stomach feels weird.    Sinus pressure  Vaped a lot during recent trip to [facility]  Weird stomach     Went to  Sunday  Meclizine not helping  Gets anxious in middle of night  LMP 6/3/23  No UTI sx    Objective   Vital Signs:  /67   Pulse 71   Temp 96.4 °F (35.8 °C) (Temporal)   Ht 157.5 cm (62\")   Wt 50.8 kg (111 lb 14.4 oz)   SpO2 97%   BMI 20.47 kg/m²   Estimated body mass index is 20.47 kg/m² as calculated from the following:    Height as of this encounter: 157.5 cm (62\").    Weight as of this encounter: 50.8 kg (111 lb 14.4 oz).       BMI is within normal parameters. No other follow-up for BMI required.      Physical Exam  Vitals and nursing note reviewed.   Constitutional:       General: She is not in acute distress.     Appearance: She is well-developed. She is not ill-appearing or diaphoretic.   HENT:      Head: Normocephalic and atraumatic.      Right Ear: Ear canal and external ear normal.      Left Ear: Ear canal and external ear normal.      Ears:      Comments: B/l TM dull     Mouth/Throat:      Mouth: Mucous membranes are moist.      Pharynx: Posterior oropharyngeal erythema present. No oropharyngeal exudate.   Eyes:      General: No scleral icterus.        Right eye: No discharge.         Left eye: No discharge.      Conjunctiva/sclera: Conjunctivae normal.   Cardiovascular:      Rate and Rhythm: Normal rate and regular rhythm.      Heart sounds: Normal " heart sounds.   Pulmonary:      Effort: Pulmonary effort is normal.      Breath sounds: Normal breath sounds.   Abdominal:      General: Bowel sounds are normal. There is no distension.      Palpations: Abdomen is soft. There is no mass.      Tenderness: There is no abdominal tenderness. There is no guarding.   Musculoskeletal:         General: No deformity.      Comments: Gait smooth and steady   Skin:     General: Skin is warm and dry.   Neurological:      Mental Status: She is alert and oriented to person, place, and time.   Psychiatric:         Mood and Affect: Mood normal.         Behavior: Behavior normal.      Result Review :                   Assessment and Plan   Diagnoses and all orders for this visit:    1. Dizziness (Primary)  -     CBC & Differential  -     Comprehensive Metabolic Panel  -     TSH Rfx On Abnormal To Free T4  -     POCT urinalysis dipstick, automated  -     POCT SARS-CoV-2 Antigen PAGE  -     POCT pregnancy, urine    I think dizziness and other sx likely related to recent trip-ears are mildly congested which we discussed.  Pregnancy negative.  Covid negative.  Continue hydration, avoid vaping.  She would like to get basic labs done today which is reasonable.           Follow Up   No follow-ups on file.  Patient was given instructions and counseling regarding her condition or for health maintenance advice. Please see specific information pulled into the AVS if appropriate.

## 2023-06-07 LAB
ALBUMIN SERPL-MCNC: 5.3 G/DL (ref 3.5–5.2)
ALBUMIN/GLOB SERPL: 2.5 G/DL
ALP SERPL-CCNC: 63 U/L (ref 39–117)
ALT SERPL-CCNC: 11 U/L (ref 1–33)
AST SERPL-CCNC: 21 U/L (ref 1–32)
BASOPHILS # BLD AUTO: 0.03 10*3/MM3 (ref 0–0.2)
BASOPHILS NFR BLD AUTO: 0.6 % (ref 0–1.5)
BILIRUB SERPL-MCNC: 0.8 MG/DL (ref 0–1.2)
BUN SERPL-MCNC: 15 MG/DL (ref 6–20)
BUN/CREAT SERPL: 24.2 (ref 7–25)
CALCIUM SERPL-MCNC: 9.8 MG/DL (ref 8.6–10.5)
CHLORIDE SERPL-SCNC: 105 MMOL/L (ref 98–107)
CO2 SERPL-SCNC: 24 MMOL/L (ref 22–29)
CREAT SERPL-MCNC: 0.62 MG/DL (ref 0.57–1)
EGFRCR SERPLBLD CKD-EPI 2021: 126.9 ML/MIN/1.73
EOSINOPHIL # BLD AUTO: 0.33 10*3/MM3 (ref 0–0.4)
EOSINOPHIL NFR BLD AUTO: 6.3 % (ref 0.3–6.2)
ERYTHROCYTE [DISTWIDTH] IN BLOOD BY AUTOMATED COUNT: 12.2 % (ref 12.3–15.4)
GLOBULIN SER CALC-MCNC: 2.1 GM/DL
GLUCOSE SERPL-MCNC: 90 MG/DL (ref 65–99)
HCT VFR BLD AUTO: 41 % (ref 34–46.6)
HGB BLD-MCNC: 13.8 G/DL (ref 12–15.9)
IMM GRANULOCYTES # BLD AUTO: 0.01 10*3/MM3 (ref 0–0.05)
IMM GRANULOCYTES NFR BLD AUTO: 0.2 % (ref 0–0.5)
LYMPHOCYTES # BLD AUTO: 1.08 10*3/MM3 (ref 0.7–3.1)
LYMPHOCYTES NFR BLD AUTO: 20.5 % (ref 19.6–45.3)
MCH RBC QN AUTO: 30.8 PG (ref 26.6–33)
MCHC RBC AUTO-ENTMCNC: 33.7 G/DL (ref 31.5–35.7)
MCV RBC AUTO: 91.5 FL (ref 79–97)
MONOCYTES # BLD AUTO: 0.4 10*3/MM3 (ref 0.1–0.9)
MONOCYTES NFR BLD AUTO: 7.6 % (ref 5–12)
NEUTROPHILS # BLD AUTO: 3.43 10*3/MM3 (ref 1.7–7)
NEUTROPHILS NFR BLD AUTO: 64.8 % (ref 42.7–76)
NRBC BLD AUTO-RTO: 0 /100 WBC (ref 0–0.2)
PLATELET # BLD AUTO: 271 10*3/MM3 (ref 140–450)
POTASSIUM SERPL-SCNC: 4.6 MMOL/L (ref 3.5–5.2)
PROT SERPL-MCNC: 7.4 G/DL (ref 6–8.5)
RBC # BLD AUTO: 4.48 10*6/MM3 (ref 3.77–5.28)
SODIUM SERPL-SCNC: 141 MMOL/L (ref 136–145)
TSH SERPL DL<=0.005 MIU/L-ACNC: 1.02 UIU/ML (ref 0.27–4.2)
WBC # BLD AUTO: 5.28 10*3/MM3 (ref 3.4–10.8)

## 2023-06-12 RX ORDER — ESCITALOPRAM OXALATE 5 MG/1
TABLET ORAL
Qty: 30 TABLET | Refills: 5 | Status: SHIPPED | OUTPATIENT
Start: 2023-06-12

## 2023-07-27 RX ORDER — ESCITALOPRAM OXALATE 10 MG/1
10 TABLET ORAL DAILY
Qty: 90 TABLET | Refills: 1 | Status: SHIPPED | OUTPATIENT
Start: 2023-07-27

## 2023-10-30 ENCOUNTER — TELEPHONE (OUTPATIENT)
Dept: OBSTETRICS AND GYNECOLOGY | Age: 23
End: 2023-10-30
Payer: COMMERCIAL

## 2023-10-30 NOTE — TELEPHONE ENCOUNTER
Patient is scheduled tomorrow for a problem visit but she want to know if she can do plan B having pacemaker ?. She will be calling cardiologist as well to check .

## 2023-10-31 ENCOUNTER — OFFICE VISIT (OUTPATIENT)
Dept: OBSTETRICS AND GYNECOLOGY | Age: 23
End: 2023-10-31
Payer: COMMERCIAL

## 2023-10-31 VITALS
SYSTOLIC BLOOD PRESSURE: 110 MMHG | WEIGHT: 113 LBS | DIASTOLIC BLOOD PRESSURE: 62 MMHG | HEIGHT: 62 IN | BODY MASS INDEX: 20.8 KG/M2

## 2023-10-31 DIAGNOSIS — N89.8 VAGINAL DISCHARGE: Primary | ICD-10-CM

## 2023-10-31 NOTE — PROGRESS NOTES
"Subjective     Chief Complaint   Patient presents with    Gynecologic Exam     Vaginal itching and odor, condom was left inside vagina over the weekend       Karyna Hartman is a 23 y.o.  whose LMP is Patient's last menstrual period was 10/17/2023.     Pt presents today with chief complaint of vaginal discharge with itching/odor  She had IC Saturday and condom was left inside vagina  She also took plan B Monday night since she had ovulated a couple days before IC  She is not regularly SA and declines anything else for contraception      No Additional Complaints Reported    The following portions of the patient's history were reviewed and updated as appropriate:vital signs, allergies, current medications, past medical history, past social history, past surgical history, and problem list      Review of Systems   Pertinent items are noted in HPI.     Objective      /62   Ht 157.5 cm (62\")   Wt 51.3 kg (113 lb)   LMP 10/17/2023   BMI 20.67 kg/m²     Physical Exam    General:   alert and no distress   Heart: Not performed today   Lungs: Not performed today.   Breast: Not performed today   Neck: na   Abdomen: {Not performed today   CVA: Not performed today   Pelvis: External genitalia: normal general appearance  Urinary system: urethral meatus normal  Vaginal: normal mucosa without prolapse or lesions, normal without tenderness, induration or masses, and normal rugae, small white/yellow discharge noted  Cervix: normal appearance   Extremities: Not performed today   Neurologic: negative   Psychiatric: Normal affect, judgement, and mood       Lab Review   Labs: No data reviewed     Imaging   No data reviewed    Assessment & Plan     ASSESSMENT  1. Vaginal discharge        PLAN  1.   Orders Placed This Encounter   Procedures    NuSwab VG+ - Swab, Vagina       2. Medications prescribed this encounter:      No orders of the defined types were placed in this encounter.      3. Swab sent for std's, BV and " yeast. Will call with results.     Follow up: MARIA TERESA Castro, APRN  10/31/2023

## 2023-11-02 LAB
A VAGINAE DNA VAG QL NAA+PROBE: NORMAL SCORE
BVAB2 DNA VAG QL NAA+PROBE: NORMAL SCORE
C ALBICANS DNA VAG QL NAA+PROBE: NEGATIVE
C GLABRATA DNA VAG QL NAA+PROBE: NEGATIVE
C TRACH DNA VAG QL NAA+PROBE: NEGATIVE
MEGA1 DNA VAG QL NAA+PROBE: NORMAL SCORE
N GONORRHOEA DNA VAG QL NAA+PROBE: NEGATIVE
T VAGINALIS DNA VAG QL NAA+PROBE: NEGATIVE

## 2023-12-18 RX ORDER — ESCITALOPRAM OXALATE 5 MG/1
5 TABLET ORAL DAILY
Qty: 90 TABLET | Refills: 2 | Status: SHIPPED | OUTPATIENT
Start: 2023-12-18

## 2023-12-18 RX ORDER — ESCITALOPRAM OXALATE 10 MG/1
10 TABLET ORAL DAILY
Qty: 90 TABLET | Refills: 1 | OUTPATIENT
Start: 2023-12-18

## 2024-01-30 ENCOUNTER — TELEPHONE (OUTPATIENT)
Dept: OBSTETRICS AND GYNECOLOGY | Age: 24
End: 2024-01-30
Payer: COMMERCIAL

## 2024-01-30 ENCOUNTER — OFFICE VISIT (OUTPATIENT)
Dept: FAMILY MEDICINE CLINIC | Facility: CLINIC | Age: 24
End: 2024-01-30
Payer: COMMERCIAL

## 2024-01-30 VITALS
OXYGEN SATURATION: 98 % | DIASTOLIC BLOOD PRESSURE: 62 MMHG | WEIGHT: 120 LBS | RESPIRATION RATE: 16 BRPM | HEART RATE: 98 BPM | HEIGHT: 62 IN | BODY MASS INDEX: 22.08 KG/M2 | SYSTOLIC BLOOD PRESSURE: 103 MMHG | TEMPERATURE: 98.2 F

## 2024-01-30 DIAGNOSIS — J02.9 SORE THROAT: ICD-10-CM

## 2024-01-30 DIAGNOSIS — R05.1 ACUTE COUGH: ICD-10-CM

## 2024-01-30 DIAGNOSIS — B34.9 ACUTE VIRAL SYNDROME: Primary | ICD-10-CM

## 2024-01-30 LAB
EXPIRATION DATE: NORMAL
EXPIRATION DATE: NORMAL
FLUAV AG UPPER RESP QL IA.RAPID: NOT DETECTED
FLUBV AG UPPER RESP QL IA.RAPID: NOT DETECTED
INTERNAL CONTROL: NORMAL
INTERNAL CONTROL: NORMAL
Lab: NORMAL
Lab: NORMAL
S PYO AG THROAT QL: NEGATIVE
SARS-COV-2 AG UPPER RESP QL IA.RAPID: NOT DETECTED

## 2024-01-30 PROCEDURE — 87428 SARSCOV & INF VIR A&B AG IA: CPT | Performed by: NURSE PRACTITIONER

## 2024-01-30 PROCEDURE — 99213 OFFICE O/P EST LOW 20 MIN: CPT | Performed by: NURSE PRACTITIONER

## 2024-01-30 PROCEDURE — 87880 STREP A ASSAY W/OPTIC: CPT | Performed by: NURSE PRACTITIONER

## 2024-01-30 NOTE — TELEPHONE ENCOUNTER
----- Message from Karyna Hartman sent at 1/30/2024  8:19 AM EST -----  Regarding: genetic testing  Contact: [phone]  What did Dr. Vickers say about where to do the labs for genetic testing?

## 2024-01-30 NOTE — PATIENT INSTRUCTIONS
Discharge instructions push fluids treat fever body aches with Tylenol or ibuprofen,    Anything over-the-counter such as Epperson's mental Lippes saline nasal spray if needed  Chloraseptic spray etc., if high fever chest pain increase shortness of breath weakness emergency room  If feeling worse tomorrow repeat your COVID if positive out reach to me immediately we should consider Paxlovid    Return to work as long as you are feeling better Thursday if I need to extend your note let me know I will be happy to do so  If you could update your condition Monday please or sooner

## 2024-01-30 NOTE — PROGRESS NOTES
"Chief Complaint  Sore Throat (Pt has had sore throat, headache,cough and  lgv since Sunday )    Subjective        Karyna Hartman presents to [facility]  History of Present Illness  Pleasant patient complains of 2 days cough congestion sore throat started Sunday, some bodyaches, without chest pain or increasing shortness of breath, generally healthy she had a septal defect repaired congenitally pacemaker but she is in good health with her heart she is without increased shortness of breath or chest pain.     She had the early COVID vaccines, no known exposure.  No history of lung disease does not smoke  Sore Throat         Objective   Vital Signs:  /62   Pulse 98   Temp 98.2 °F (36.8 °C) (Infrared)   Resp 16   Ht 157.5 cm (62.01\")   Wt 54.4 kg (120 lb)   SpO2 98%   BMI 21.94 kg/m²   Estimated body mass index is 21.94 kg/m² as calculated from the following:    Height as of this encounter: 157.5 cm (62.01\").    Weight as of this encounter: 54.4 kg (120 lb).    BMI is within normal parameters. No other follow-up for BMI required.      Physical Exam  Vitals reviewed.   Constitutional:       General: She is not in acute distress.     Appearance: Normal appearance. She is well-developed. She is not ill-appearing, toxic-appearing or diaphoretic.   HENT:      Head: Normocephalic.      Nose: Nose normal.   Eyes:      General: No scleral icterus.     Conjunctiva/sclera: Conjunctivae normal.      Pupils: Pupils are equal, round, and reactive to light.   Neck:      Thyroid: No thyromegaly.      Vascular: No JVD.   Cardiovascular:      Rate and Rhythm: Normal rate and regular rhythm.      Heart sounds: Normal heart sounds. No murmur heard.     No friction rub. No gallop.      Comments: Regular rate and rhythm, murmur 2/6 without radiation.  Pulmonary:      Effort: Pulmonary effort is normal. No respiratory distress.      Breath sounds: Normal breath sounds. No stridor. No wheezing or " rales.      Comments: Clear unlabored respirations less than 20 able to speak full sentences without dyspnea  Abdominal:      General: Bowel sounds are normal. There is no distension.      Palpations: Abdomen is soft.      Tenderness: There is no abdominal tenderness.      Comments: No hepatosplenomegaly, no ascites,   Musculoskeletal:         General: No tenderness.      Cervical back: Neck supple.   Lymphadenopathy:      Cervical: No cervical adenopathy.   Skin:     General: Skin is warm and dry.      Findings: No erythema or rash.   Neurological:      General: No focal deficit present.      Mental Status: She is alert and oriented to person, place, and time. Mental status is at baseline.      Deep Tendon Reflexes: Reflexes are normal and symmetric.   Psychiatric:         Mood and Affect: Mood normal.         Behavior: Behavior normal.         Thought Content: Thought content normal.         Judgment: Judgment normal.        Result Review :                Assessment and Plan   Diagnoses and all orders for this visit:    1. Acute viral syndrome (Primary)    2. Sore throat  -     POC Rapid Strep A    3. Acute cough  -     POCT SARS-CoV-2 + Flu Antigen PAGE             Follow Up   Return rtn wk thurs plse.  Patient was given instructions and counseling regarding her condition or for health maintenance advice. Please see specific information pulled into the AVS if appropriate.     Patient Instructions   Discharge instructions push fluids treat fever body aches with Tylenol or ibuprofen,    Anything over-the-counter such as Epperson's mental Lippes saline nasal spray if needed  Chloraseptic spray etc., if high fever chest pain increase shortness of breath weakness emergency room  If feeling worse tomorrow repeat your COVID if positive out reach to me immediately we should consider Paxlovid    Return to work as long as you are feeling better Thursday if I need to extend your note let me know I will be happy to do so  If you  could update your condition Monday please or sooner       COVID-negative flu test negative strep negative  Poor sensitivity patient understands not great negative test if she feels worse tomorrow with fever repeat  The COVID test but any significant worsening high fever lethargy weakness chest pain shortness of breath emergency room  Antivirals available for flu COVID  Hydrate well over-the-counter medicine as above close follow-up off work a couple days

## 2024-01-30 NOTE — TELEPHONE ENCOUNTER
Please let pt know that she will need to contact her insurance in regards to cost for [employer] testing.

## 2024-03-04 ENCOUNTER — OFFICE VISIT (OUTPATIENT)
Dept: OBSTETRICS AND GYNECOLOGY | Age: 24
End: 2024-03-04
Payer: COMMERCIAL

## 2024-03-04 VITALS
BODY MASS INDEX: 22.26 KG/M2 | WEIGHT: 121 LBS | SYSTOLIC BLOOD PRESSURE: 108 MMHG | HEIGHT: 62 IN | DIASTOLIC BLOOD PRESSURE: 68 MMHG

## 2024-03-04 DIAGNOSIS — N64.4 BREAST PAIN, LEFT: Primary | ICD-10-CM

## 2024-03-04 DIAGNOSIS — R39.9 UTI SYMPTOMS: ICD-10-CM

## 2024-03-04 DIAGNOSIS — Z11.3 SCREENING FOR STDS (SEXUALLY TRANSMITTED DISEASES): ICD-10-CM

## 2024-03-04 LAB
BILIRUB BLD-MCNC: NEGATIVE MG/DL
CLARITY, POC: CLEAR
COLOR UR: YELLOW
GLUCOSE UR STRIP-MCNC: NEGATIVE MG/DL
KETONES UR QL: NEGATIVE
LEUKOCYTE EST, POC: ABNORMAL
NITRITE UR-MCNC: NEGATIVE MG/ML
PH UR: 7 [PH] (ref 5–8)
PROT UR STRIP-MCNC: NEGATIVE MG/DL
RBC # UR STRIP: NEGATIVE /UL
SP GR UR: 1.01 (ref 1–1.03)
UROBILINOGEN UR QL: ABNORMAL

## 2024-03-04 PROCEDURE — 99213 OFFICE O/P EST LOW 20 MIN: CPT

## 2024-03-04 PROCEDURE — 81002 URINALYSIS NONAUTO W/O SCOPE: CPT

## 2024-03-04 NOTE — PROGRESS NOTES
"Subjective     History of Present Illness  Karyna Hartman is a 23 y.o.  female is being seen today for   Chief Complaint   Patient presents with    Follow-up     Gyn F/u - Pt c/o possible UTI & pain with urination, Pt was having sharp (Lt) sided breast pain, Pt has a pacemaker & is unsure if its related, Pt would like STD check today     C/o UTI symptoms x 3 days. States she feels as symptoms have gone away now but was experiencing pain with urination. No fevers.   Requests STD testing today.   C/o left sided breast pain x 1 month. Pt has a pacemaker, is unsure if the pain is relation. Describes that the pain occurs when breathing. Menses regular, pain does not occur around menses. No nipple discharge or skin changes. Reports she has also started working out, hot yoga. Saw cardiology last month, she did not have pain then. Pt had this pain in  and was followed up with Dr. Vickers.   Moving on the  to [place]      The following portions of the patient's history were reviewed and updated as appropriate: allergies, current medications, past family history, past medical history, past social history, past surgical history and problem list.    /68   Ht 157.5 cm (62.01\")   Wt 54.9 kg (121 lb)   LMP 2024 (Exact Date)   BMI 22.12 kg/m²         Review of Systems   Constitutional: Negative.    HENT: Negative.     Eyes: Negative.    Respiratory: Negative.     Cardiovascular: Negative.    Gastrointestinal: Negative.    Endocrine: Negative.    Genitourinary:  Positive for dysuria.   Musculoskeletal: Negative.         +left breast pain   Skin: Negative.    Allergic/Immunologic: Negative.    Neurological: Negative.    Hematological: Negative.    Psychiatric/Behavioral: Negative.         Objective   Physical Exam  Constitutional:       General: She is not in acute distress.  Cardiovascular:      Rate and Rhythm: Normal rate and regular rhythm.      Pulses: Normal pulses.      Heart sounds: Normal heart " sounds.   Pulmonary:      Effort: Pulmonary effort is normal.      Breath sounds: Normal breath sounds.   Chest:   Breasts:     Right: Normal. No swelling, mass, nipple discharge, skin change or tenderness.      Left: Normal. No swelling, mass, nipple discharge, skin change or tenderness.   Neurological:      General: No focal deficit present.      Mental Status: She is alert and oriented to person, place, and time.   Psychiatric:         Mood and Affect: Mood normal.         Behavior: Behavior normal.         Thought Content: Thought content normal.         Judgment: Judgment normal.           Assessment & Plan   Diagnoses and all orders for this visit:    1. Breast pain, left (Primary)    2. UTI symptoms  -     POC Urinalysis Dipstick  -     Urine Culture - Urine, Urine, Clean Catch    3. Screening for STDs (sexually transmitted diseases)  -     Chlamydia trachomatis, Neisseria gonorrhoeae, Trichomonas vaginalis, PCR - Urine, Urine, Clean Catch    -Normal breast exam. Recommended breast ultrasound to further investigate, since pt had this complaint in 2020 as well. Patient declined ultrasound. Recommend patient also f/u with cardiology, discussed this with pt.   -UA has small leukocytes, urine culture sent.   -STD testing by urine, will call pt with results and treat accordingly.    All questions answered. Patient verbalizes understanding and is agreeable to plan.  Return if symptoms worsen or fail to improve.

## 2024-03-06 LAB
BACTERIA UR CULT: NO GROWTH
BACTERIA UR CULT: NORMAL
C TRACH RRNA SPEC QL NAA+PROBE: NEGATIVE
N GONORRHOEA RRNA SPEC QL NAA+PROBE: NEGATIVE
T VAGINALIS RRNA SPEC QL NAA+PROBE: NEGATIVE

## 2024-09-03 RX ORDER — ESCITALOPRAM OXALATE 5 MG/1
5 TABLET ORAL DAILY
Qty: 90 TABLET | Refills: 2 | Status: SHIPPED | OUTPATIENT
Start: 2024-09-03

## 2024-09-03 NOTE — TELEPHONE ENCOUNTER
Rx Refill Note  Requested Prescriptions     Pending Prescriptions Disp Refills    escitalopram (LEXAPRO) 5 MG tablet [Pharmacy Med Name: ESCITALOPRAM 5 MG TABLET] 90 tablet 2     Sig: TAKE 1 TABLET BY MOUTH DAILY      Last office visit with prescribing clinician: Visit date not found   Last telemedicine visit with prescribing clinician: Visit date not found   Next office visit with prescribing clinician: Visit date not found                         Would you like a call back once the refill request has been completed: [] Yes [] No    If the office needs to give you a call back, can they leave a voicemail: [] Yes [] No    Shani Bravo MA  09/03/24, 13:51 EDT

## 2025-02-26 RX ORDER — ESCITALOPRAM OXALATE 10 MG/1
10 TABLET ORAL DAILY
Qty: 90 TABLET | Refills: 0 | Status: SHIPPED | OUTPATIENT
Start: 2025-02-26

## 2025-03-26 ENCOUNTER — OFFICE VISIT (OUTPATIENT)
Dept: OBSTETRICS AND GYNECOLOGY | Age: 25
End: 2025-03-26
Payer: COMMERCIAL

## 2025-03-26 VITALS
DIASTOLIC BLOOD PRESSURE: 68 MMHG | WEIGHT: 126 LBS | BODY MASS INDEX: 23.19 KG/M2 | SYSTOLIC BLOOD PRESSURE: 114 MMHG | HEIGHT: 62 IN

## 2025-03-26 DIAGNOSIS — Z01.419 ENCOUNTER FOR GYNECOLOGICAL EXAMINATION WITHOUT ABNORMAL FINDING: Primary | ICD-10-CM

## 2025-03-26 NOTE — PROGRESS NOTES
Routine Annual Visit    3/26/2025    Patient: Karyna Hartman          MR#:[id number]      Chief Complaint   Patient presents with    Gynecologic Exam     AE Today, Last pap 3/15/2022 (-). Pt wanting STD screening        History of Present Illness    24 y.o. female  who presents for annual exam.     History of Present Illness  The patient presents for a well-woman exam.    She has recently relocated from [place] and is currently seeking employment. She reports no new medical issues or family history of breast, ovarian, uterine, or colon cancer. She has been sexually active this year without any associated pain, bleeding, or dryness. She does not require birth control at this time. She experiences intermittent lower abdominal cramping, similar to menstrual cramps, which resolve spontaneously after a few minutes. Additionally, she reports hair loss.    FAMILY HISTORY  She reports no family history of breast, ovarian, uterine, or colon cancer.      Patient's last menstrual period was 2025 (exact date).  Obstetric History:  OB History          0    Para   0    Term   0       0    AB   0    Living   0         SAB   0    IAB   0    Ectopic   0    Molar   0    Multiple   0    Live Births   0               Menstrual History:     Patient's last menstrual period was 2025 (exact date).       ________________________________________  Patient Active Problem List   Diagnosis    Mastalgia    S/P VSD repair    Pacemaker    Anxiety       Past Medical History:   Diagnosis Date    Congenital heart defect     Coronary artery disease     History of medical problems congenital heart defect    Pacemaker     Pacemaker     VSD (ventricular septal defect)        Family History   Problem Relation Age of Onset    Ovarian cancer Maternal Grandmother     Cancer Maternal Grandmother         OVARIAN    Endometriosis Maternal Aunt     Drug abuse Brother     COPD Maternal Grandfather         MESOTHELIOMA    Cancer  "Paternal Grandfather         SPINAL       Past Surgical History:   Procedure Laterality Date    CARDIAC SURGERY      INSERT / REPLACE / REMOVE PACEMAKER      PACEMAKER IMPLANTATION      TRICUSPID VALVE REPLACEMENT         Social History     Tobacco Use   Smoking Status Never   Smokeless Tobacco Never       has a current medication list which includes the following prescription(s): escitalopram.  ________________________________________        Objective   Physical Exam    /68   Ht 157.5 cm (62.01\")   Wt 57.2 kg (126 lb)   LMP 03/17/2025 (Exact Date)   BMI 23.04 kg/m²    BP Readings from Last 3 Encounters:   03/26/25 114/68   03/04/24 108/68   01/30/24 103/62      Wt Readings from Last 3 Encounters:   03/26/25 57.2 kg (126 lb)   03/04/24 54.9 kg (121 lb)   01/30/24 54.4 kg (120 lb)         BMI: Body mass index is 23.04 kg/m².       General:   alert, appears stated age, and cooperative   Neck: No thyromegaly or LAD   Abdomen: soft, non-tender, without masses or organomegaly   Breast: inspection negative, no nipple discharge or bleeding, no masses or nodularity palpable   Urethra and bladder: urethral meatus normal; bladder nontender to palpation;   Vulva: normal, Bartholin's, Urethra, [place]'s normal   Vagina: normal mucosa, normal discharge   Cervix: no lesions and nulliparous appearance   Uterus: normal size, non-tender, and anteverted   Adnexa: normal adnexa and no mass, fullness, tenderness       Assessment:    normal annual exam     Plan:    Plan     Assessment & Plan  1. Well-woman examination.  Her lower abdominal cramping could be attributed to random uterine or colon cramps, which are not indicative of any serious condition. A Pap smear will be conducted today, and the results will be communicated via MyChart or phone call.    Follow-up  The patient is scheduled for a follow-up visit in 1 year.      []  Mammogram request made  [x]  PAP done  []  Labs:   []  GC/Chl/TV  []  DEXA scan   []  Referral for " colonoscopy:       Counseling  [] Menopause  [x]  Nutrition  [x]  Physical activity/regular exercise   [x]  Healthy weight  []  Injury prevention  []  Smoking cessation  []  Substance misuse/abuse  [x]  Sexual behavior  []  STD prevention  [x]  Contraception  []  Dental health  []  Mental health  []  Immunization  [x]  Encouraged SBE        Kathrine Vickers MD  03/26/2025  12:48 EDT    Patient or patient representative verbalized consent for the use of Ambient Listening during the visit with  Kathrine Vickers MD for chart documentation. 3/26/2025  12:49 EDT

## 2025-04-01 LAB
C TRACH RRNA CVX QL NAA+PROBE: NEGATIVE
CONV .: ABNORMAL
CYTOLOGIST CVX/VAG CYTO: ABNORMAL
CYTOLOGY CVX/VAG DOC CYTO: ABNORMAL
CYTOLOGY CVX/VAG DOC THIN PREP: ABNORMAL
DX ICD CODE: ABNORMAL
DX ICD CODE: ABNORMAL
N GONORRHOEA RRNA CVX QL NAA+PROBE: NEGATIVE
OTHER STN SPEC: ABNORMAL
PATHOLOGIST CVX/VAG CYTO: ABNORMAL
RECOM F/U CVX/VAG CYTO: ABNORMAL
SERVICE CMNT-IMP: ABNORMAL
STAT OF ADQ CVX/VAG CYTO-IMP: ABNORMAL
T VAGINALIS RRNA SPEC QL NAA+PROBE: NEGATIVE

## 2025-05-05 ENCOUNTER — PROCEDURE VISIT (OUTPATIENT)
Dept: OBSTETRICS AND GYNECOLOGY | Age: 25
End: 2025-05-05
Payer: COMMERCIAL

## 2025-05-05 VITALS
DIASTOLIC BLOOD PRESSURE: 70 MMHG | BODY MASS INDEX: 21.71 KG/M2 | SYSTOLIC BLOOD PRESSURE: 116 MMHG | HEIGHT: 62 IN | WEIGHT: 118 LBS

## 2025-05-05 DIAGNOSIS — R87.612 LOW GRADE SQUAMOUS INTRAEPITHELIAL LESION (LGSIL) ON CERVICAL PAP SMEAR: ICD-10-CM

## 2025-05-05 DIAGNOSIS — Z13.9 SPECIAL SCREENING: Primary | ICD-10-CM

## 2025-05-05 LAB
B-HCG UR QL: NEGATIVE
EXPIRATION DATE: NORMAL
INTERNAL NEGATIVE CONTROL: NEGATIVE
INTERNAL POSITIVE CONTROL: POSITIVE
Lab: NORMAL

## 2025-05-05 PROCEDURE — 57455 BIOPSY OF CERVIX W/SCOPE: CPT | Performed by: OBSTETRICS & GYNECOLOGY

## 2025-05-05 PROCEDURE — 81025 URINE PREGNANCY TEST: CPT | Performed by: OBSTETRICS & GYNECOLOGY

## 2025-05-05 NOTE — PROGRESS NOTES
PROCEDURE NOTE   Karyna Hartman is being see for a Diagnosis of  low-grade squamous intraepithelial neoplasia (LGSIL - encompassing HPV,mild dysplasia,GUIDO I)    Patient's last menstrual period was 04/15/2025. Menopause no  Pregnant no  Gardasil up to date    COLPOSCOPIC PROCEDURE    5% Acetic Acid was used to prepare the cervix for examination. There were mild acetowhite changes and 12 o clock and a biopsy was performed here. Monsels solution was applied and hemostasis observed.    The total number of biopsies were 1.   The total  number of specimen containers were 1  The procedure was well tolerated. Instructions on vaginal rest and possibly bleeding were discussed as well as follow up for results.    Kathrine Vickers MD  5/5/2025  12:01 EDT

## 2025-05-07 LAB
DX ICD CODE: NORMAL
DX ICD CODE: NORMAL
PATH REPORT.FINAL DX SPEC: NORMAL
PATH REPORT.GROSS SPEC: NORMAL
PATH REPORT.SITE OF ORIGIN SPEC: NORMAL
PATHOLOGIST NAME: NORMAL
PAYMENT PROCEDURE: NORMAL

## 2025-05-21 ENCOUNTER — TELEPHONE (OUTPATIENT)
Dept: FAMILY MEDICINE CLINIC | Facility: CLINIC | Age: 25
End: 2025-05-21

## 2025-05-21 NOTE — TELEPHONE ENCOUNTER
Caller: Karyna Hartman    Relationship: Self    Best call back number: [phone]     What orders are you requesting (i.e. lab or imaging): LAB ORDERS FOR ANNUAL PHYSICAL    In what timeframe would the patient need to come in: BEFORE APPT ON 1/22/26    Where will you receive your lab/imaging services: OFFICE    Additional notes: PLEASE CALL PATIENT TO SCHEDULE WHEN ORDERS ARE IN     [Negative] : Heme/Lymph [Back Pain] : back pain

## 2025-06-02 RX ORDER — ESCITALOPRAM OXALATE 10 MG/1
10 TABLET ORAL DAILY
Qty: 90 TABLET | Refills: 0 | Status: SHIPPED | OUTPATIENT
Start: 2025-06-02

## 2025-06-02 NOTE — TELEPHONE ENCOUNTER
Rx Refill Note  Requested Prescriptions     Pending Prescriptions Disp Refills    escitalopram (LEXAPRO) 10 MG tablet 90 tablet 0     Sig: Take 1 tablet by mouth Daily.      Last office visit with prescribing clinician: Visit date not found   Last telemedicine visit with prescribing clinician: Visit date not found   Next office visit with prescribing clinician: 7/16/2025                         Would you like a call back once the refill request has been completed: [] Yes [] No    If the office needs to give you a call back, can they leave a voicemail: [] Yes [] No    Sam Kingsley MA  06/02/25, 15:37 EDT

## 2025-07-16 ENCOUNTER — OFFICE VISIT (OUTPATIENT)
Dept: FAMILY MEDICINE CLINIC | Facility: CLINIC | Age: 25
End: 2025-07-16
Payer: COMMERCIAL

## 2025-07-16 VITALS
WEIGHT: 114.4 LBS | HEART RATE: 77 BPM | HEIGHT: 62 IN | BODY MASS INDEX: 21.05 KG/M2 | OXYGEN SATURATION: 98 % | SYSTOLIC BLOOD PRESSURE: 118 MMHG | DIASTOLIC BLOOD PRESSURE: 72 MMHG

## 2025-07-16 DIAGNOSIS — F41.9 ANXIETY: Primary | ICD-10-CM

## 2025-07-16 PROCEDURE — 99213 OFFICE O/P EST LOW 20 MIN: CPT | Performed by: INTERNAL MEDICINE

## 2025-07-16 RX ORDER — DESVENLAFAXINE 25 MG/1
25 TABLET, EXTENDED RELEASE ORAL DAILY
Qty: 90 TABLET | Refills: 1 | Status: SHIPPED | OUTPATIENT
Start: 2025-07-16
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
